# Patient Record
Sex: MALE | Race: BLACK OR AFRICAN AMERICAN | Employment: FULL TIME | URBAN - METROPOLITAN AREA
[De-identification: names, ages, dates, MRNs, and addresses within clinical notes are randomized per-mention and may not be internally consistent; named-entity substitution may affect disease eponyms.]

---

## 2019-09-12 ENCOUNTER — HOSPITAL ENCOUNTER (EMERGENCY)
Facility: HOSPITAL | Age: 29
Discharge: HOME | End: 2019-09-12
Attending: EMERGENCY MEDICINE
Payer: COMMERCIAL

## 2019-09-12 VITALS
DIASTOLIC BLOOD PRESSURE: 76 MMHG | HEIGHT: 68 IN | RESPIRATION RATE: 18 BRPM | TEMPERATURE: 99 F | OXYGEN SATURATION: 98 % | SYSTOLIC BLOOD PRESSURE: 116 MMHG | BODY MASS INDEX: 24.25 KG/M2 | WEIGHT: 160 LBS | HEART RATE: 86 BPM

## 2019-09-12 DIAGNOSIS — J02.9 VIRAL PHARYNGITIS: Primary | ICD-10-CM

## 2019-09-12 LAB — S PYO AG THROAT QL: NEGATIVE

## 2019-09-12 PROCEDURE — 99282 EMERGENCY DEPT VISIT SF MDM: CPT

## 2019-09-12 PROCEDURE — 87430 STREP A AG IA: CPT

## 2019-09-13 NOTE — ED NOTES
/79   Pulse 92   Temp 98.3 °F (36.8 °C) (Oral)   Resp 18   Ht 172.7 cm (5' 8\")   Wt 72.6 kg   SpO2 96%   BMI 24.33 kg/m²     Chief complaint reviewed  Patient here to get a \"throat test\" airway patent lung sounds clear.  Speaking in clear ful lsent

## 2019-09-13 NOTE — ED PROVIDER NOTES
Patient Seen in: [facility] AND [facility] Emergency Department    History   Patient presents with: Allergic Rxn Allergies (immune)    Stated Complaint: allergic reaction    HPI    36yoM with hx of GERD, here with c/o neck pain, sore throat for the past 3 days. reviewed and negative except as noted above.     Physical Exam     ED Triage Vitals [09/12/19 1951]   /79   Pulse 92   Resp 18   Temp 98.3 °F (36.8 °C)   Temp src Oral   SpO2 96 %   O2 Device None (Room air)       Current:/79   Pulse 92   Temp 9 Imaging Results Available and Reviewed by me while in ED:          EMERGENCY DEPARTMENT COURSE AND TREATMENT:  Patient's condition was stable during Emergency Department evaluation.      28yoM with sore throat  - I personally reviewed and interpreted

## 2019-09-13 NOTE — ED INITIAL ASSESSMENT (HPI)
Pt has been taking Ranitidine and believes he's having an allergic reaction. C/o difficulty swallowing. Airway and breathing intact at this time.

## 2020-04-29 ENCOUNTER — APPOINTMENT (OUTPATIENT)
Dept: GENERAL RADIOLOGY | Facility: HOSPITAL | Age: 30
End: 2020-04-29
Attending: EMERGENCY MEDICINE
Payer: COMMERCIAL

## 2020-04-29 ENCOUNTER — HOSPITAL ENCOUNTER (EMERGENCY)
Facility: HOSPITAL | Age: 30
Discharge: HOME | End: 2020-04-29
Attending: EMERGENCY MEDICINE
Payer: COMMERCIAL

## 2020-04-29 VITALS
SYSTOLIC BLOOD PRESSURE: 125 MMHG | DIASTOLIC BLOOD PRESSURE: 76 MMHG | RESPIRATION RATE: 16 BRPM | WEIGHT: 150 LBS | BODY MASS INDEX: 22.73 KG/M2 | HEIGHT: 68 IN | TEMPERATURE: 98 F | HEART RATE: 62 BPM | OXYGEN SATURATION: 97 %

## 2020-04-29 DIAGNOSIS — R06.02 SHORTNESS OF BREATH: ICD-10-CM

## 2020-04-29 DIAGNOSIS — Z20.822 SUSPECTED 2019 NOVEL CORONAVIRUS INFECTION: Primary | ICD-10-CM

## 2020-04-29 PROCEDURE — 93005 ELECTROCARDIOGRAM TRACING: CPT

## 2020-04-29 PROCEDURE — 71045 X-RAY EXAM CHEST 1 VIEW: CPT | Performed by: EMERGENCY MEDICINE

## 2020-04-29 PROCEDURE — 99284 EMERGENCY DEPT VISIT MOD MDM: CPT

## 2020-04-29 PROCEDURE — 93010 ELECTROCARDIOGRAM REPORT: CPT | Performed by: EMERGENCY MEDICINE

## 2020-04-29 NOTE — ED INITIAL ASSESSMENT (HPI)
patient states he is feeling intermittent SOB since Friday. Denies cough. Was tested for covid yesterday and was  negative.

## 2020-04-29 NOTE — ED PROVIDER NOTES
Patient Seen in: [facility] AND [facility] Emergency Department    History   Patient presents with:  Dyspnea JOHN SOB      HPI    Patient presents to the ED complaining of intermittent shortness of breath for the past several days.   No cough but does feel occasio Stethoscope and any equipment used during my examination was cleaned with super sani-cloth germicidal wipes following the exam.     Physical Exam   Constitutional: He is oriented to person, place, and time. He appears well-developed and well-nourished.  No have a problem list on file. to contribute to the complexity of this ED evaluation. ED Course: Patient presents to the ED with possible COVID-19 infection. Rapid testing performed and is negative.   Patient advised on possible false positive test result

## 2020-09-23 ENCOUNTER — HOSPITAL ENCOUNTER (EMERGENCY)
Facility: HOSPITAL | Age: 30
Discharge: HOME | End: 2020-09-23
Attending: EMERGENCY MEDICINE
Payer: COMMERCIAL

## 2020-09-23 VITALS
OXYGEN SATURATION: 98 % | WEIGHT: 155 LBS | TEMPERATURE: 98 F | DIASTOLIC BLOOD PRESSURE: 81 MMHG | RESPIRATION RATE: 18 BRPM | BODY MASS INDEX: 23.49 KG/M2 | SYSTOLIC BLOOD PRESSURE: 123 MMHG | HEART RATE: 91 BPM | HEIGHT: 68 IN

## 2020-09-23 DIAGNOSIS — R19.7 DIARRHEA, UNSPECIFIED TYPE: Primary | ICD-10-CM

## 2020-09-23 LAB
ANION GAP SERPL CALC-SCNC: 4 MMOL/L (ref 0–18)
BASOPHILS # BLD AUTO: 0.05 X10(3) UL (ref 0–0.2)
BASOPHILS NFR BLD AUTO: 1.2 %
BILIRUB UR QL: NEGATIVE
BUN BLD-MCNC: 10 MG/DL (ref 7–18)
BUN/CREAT SERPL: 11.2 (ref 10–20)
CALCIUM BLD-MCNC: 8.5 MG/DL (ref 8.5–10.1)
CHLORIDE SERPL-SCNC: 110 MMOL/L (ref 98–112)
CLARITY UR: CLEAR
CO2 SERPL-SCNC: 27 MMOL/L (ref 21–32)
COLOR UR: YELLOW
CREAT BLD-MCNC: 0.89 MG/DL
DEPRECATED RDW RBC AUTO: 37.6 FL (ref 35.1–46.3)
EOSINOPHIL # BLD AUTO: 0.19 X10(3) UL (ref 0–0.7)
EOSINOPHIL NFR BLD AUTO: 4.6 %
ERYTHROCYTE [DISTWIDTH] IN BLOOD BY AUTOMATED COUNT: 11.7 % (ref 11–15)
GLUCOSE BLD-MCNC: 94 MG/DL (ref 70–99)
GLUCOSE UR-MCNC: NEGATIVE MG/DL
HCT VFR BLD AUTO: 39.9 %
HGB BLD-MCNC: 13.4 G/DL
HGB UR QL STRIP.AUTO: NEGATIVE
IMM GRANULOCYTES # BLD AUTO: 0.01 X10(3) UL (ref 0–1)
IMM GRANULOCYTES NFR BLD: 0.2 %
KETONES UR-MCNC: NEGATIVE MG/DL
LEUKOCYTE ESTERASE UR QL STRIP.AUTO: NEGATIVE
LYMPHOCYTES # BLD AUTO: 1.48 X10(3) UL (ref 1–4)
LYMPHOCYTES NFR BLD AUTO: 35.6 %
MCH RBC QN AUTO: 30.1 PG (ref 26–34)
MCHC RBC AUTO-ENTMCNC: 33.6 G/DL (ref 31–37)
MCV RBC AUTO: 89.7 FL
MONOCYTES # BLD AUTO: 0.51 X10(3) UL (ref 0.1–1)
MONOCYTES NFR BLD AUTO: 12.3 %
NEUTROPHILS # BLD AUTO: 1.92 X10 (3) UL (ref 1.5–7.7)
NEUTROPHILS # BLD AUTO: 1.92 X10(3) UL (ref 1.5–7.7)
NEUTROPHILS NFR BLD AUTO: 46.1 %
NITRITE UR QL STRIP.AUTO: NEGATIVE
OSMOLALITY SERPL CALC.SUM OF ELEC: 291 MOSM/KG (ref 275–295)
PH UR: 5 [PH] (ref 5–8)
PLATELET # BLD AUTO: 250 10(3)UL (ref 150–450)
POTASSIUM SERPL-SCNC: 3.9 MMOL/L (ref 3.5–5.1)
PROT UR-MCNC: NEGATIVE MG/DL
RBC # BLD AUTO: 4.45 X10(6)UL
SODIUM SERPL-SCNC: 141 MMOL/L (ref 136–145)
SP GR UR STRIP: 1.02 (ref 1–1.03)
UROBILINOGEN UR STRIP-ACNC: 4
WBC # BLD AUTO: 4.2 X10(3) UL (ref 4–11)

## 2020-09-23 PROCEDURE — 36415 COLL VENOUS BLD VENIPUNCTURE: CPT

## 2020-09-23 PROCEDURE — 80048 BASIC METABOLIC PNL TOTAL CA: CPT | Performed by: EMERGENCY MEDICINE

## 2020-09-23 PROCEDURE — 81003 URINALYSIS AUTO W/O SCOPE: CPT | Performed by: EMERGENCY MEDICINE

## 2020-09-23 PROCEDURE — 85025 COMPLETE CBC W/AUTO DIFF WBC: CPT | Performed by: EMERGENCY MEDICINE

## 2020-09-23 PROCEDURE — 99283 EMERGENCY DEPT VISIT LOW MDM: CPT

## 2020-09-23 NOTE — ED PROVIDER NOTES
Patient Seen in: [facility] AND [facility] Emergency Department      History   Patient presents with:  Nausea/Vomiting/Diarrhea    Stated Complaint: diarrhea x 4 days, denies pain    HPI    Patient is a 32-year-old male he states that he is lactose intolerant. distal pulses. Pulmonary/Chest: Effort normal and breath sounds normal. No respiratory distress. Abdominal: Soft. Bowel sounds are normal. Exhibits no distension and no mass. There is no tenderness. There is no rebound and no guarding.    Musculoskelet no discharge medications for this patient.

## 2021-02-05 ENCOUNTER — HOSPITAL ENCOUNTER (EMERGENCY)
Facility: HOSPITAL | Age: 31
Discharge: HOME | End: 2021-02-05
Attending: EMERGENCY MEDICINE
Payer: COMMERCIAL

## 2021-02-05 ENCOUNTER — APPOINTMENT (OUTPATIENT)
Dept: GENERAL RADIOLOGY | Facility: HOSPITAL | Age: 31
End: 2021-02-05
Attending: EMERGENCY MEDICINE
Payer: COMMERCIAL

## 2021-02-05 VITALS
WEIGHT: 165 LBS | TEMPERATURE: 98 F | DIASTOLIC BLOOD PRESSURE: 81 MMHG | RESPIRATION RATE: 18 BRPM | SYSTOLIC BLOOD PRESSURE: 129 MMHG | HEIGHT: 68 IN | BODY MASS INDEX: 25.01 KG/M2 | OXYGEN SATURATION: 98 % | HEART RATE: 63 BPM

## 2021-02-05 DIAGNOSIS — R07.89 CHEST PAIN, ATYPICAL: Primary | ICD-10-CM

## 2021-02-05 LAB
ANION GAP SERPL CALC-SCNC: 5 MMOL/L (ref 0–18)
BASOPHILS # BLD AUTO: 0.03 X10(3) UL (ref 0–0.2)
BASOPHILS NFR BLD AUTO: 0.5 %
BUN BLD-MCNC: 12 MG/DL (ref 7–18)
BUN/CREAT SERPL: 11.9 (ref 10–20)
CALCIUM BLD-MCNC: 8.7 MG/DL (ref 8.5–10.1)
CHLORIDE SERPL-SCNC: 109 MMOL/L (ref 98–112)
CO2 SERPL-SCNC: 28 MMOL/L (ref 21–32)
CREAT BLD-MCNC: 1.01 MG/DL
DEPRECATED RDW RBC AUTO: 37.9 FL (ref 35.1–46.3)
EOSINOPHIL # BLD AUTO: 0.12 X10(3) UL (ref 0–0.7)
EOSINOPHIL NFR BLD AUTO: 2.1 %
ERYTHROCYTE [DISTWIDTH] IN BLOOD BY AUTOMATED COUNT: 11.8 % (ref 11–15)
GLUCOSE BLD-MCNC: 81 MG/DL (ref 70–99)
HCT VFR BLD AUTO: 40.1 %
HGB BLD-MCNC: 13.7 G/DL
IMM GRANULOCYTES # BLD AUTO: 0.01 X10(3) UL (ref 0–1)
IMM GRANULOCYTES NFR BLD: 0.2 %
LYMPHOCYTES # BLD AUTO: 2.15 X10(3) UL (ref 1–4)
LYMPHOCYTES NFR BLD AUTO: 38.1 %
MCH RBC QN AUTO: 30.5 PG (ref 26–34)
MCHC RBC AUTO-ENTMCNC: 34.2 G/DL (ref 31–37)
MCV RBC AUTO: 89.3 FL
MONOCYTES # BLD AUTO: 0.56 X10(3) UL (ref 0.1–1)
MONOCYTES NFR BLD AUTO: 9.9 %
NEUTROPHILS # BLD AUTO: 2.77 X10 (3) UL (ref 1.5–7.7)
NEUTROPHILS # BLD AUTO: 2.77 X10(3) UL (ref 1.5–7.7)
NEUTROPHILS NFR BLD AUTO: 49.2 %
OSMOLALITY SERPL CALC.SUM OF ELEC: 293 MOSM/KG (ref 275–295)
PLATELET # BLD AUTO: 316 10(3)UL (ref 150–450)
POTASSIUM SERPL-SCNC: 3.8 MMOL/L (ref 3.5–5.1)
RBC # BLD AUTO: 4.49 X10(6)UL
SODIUM SERPL-SCNC: 142 MMOL/L (ref 136–145)
TROPONIN I SERPL-MCNC: <0.045 NG/ML (ref ?–0.04)
WBC # BLD AUTO: 5.6 X10(3) UL (ref 4–11)

## 2021-02-05 PROCEDURE — 84484 ASSAY OF TROPONIN QUANT: CPT | Performed by: EMERGENCY MEDICINE

## 2021-02-05 PROCEDURE — 99284 EMERGENCY DEPT VISIT MOD MDM: CPT

## 2021-02-05 PROCEDURE — 71045 X-RAY EXAM CHEST 1 VIEW: CPT | Performed by: EMERGENCY MEDICINE

## 2021-02-05 PROCEDURE — 93005 ELECTROCARDIOGRAM TRACING: CPT

## 2021-02-05 PROCEDURE — 36415 COLL VENOUS BLD VENIPUNCTURE: CPT

## 2021-02-05 PROCEDURE — 80048 BASIC METABOLIC PNL TOTAL CA: CPT | Performed by: EMERGENCY MEDICINE

## 2021-02-05 PROCEDURE — 85025 COMPLETE CBC W/AUTO DIFF WBC: CPT | Performed by: EMERGENCY MEDICINE

## 2021-02-05 PROCEDURE — 93010 ELECTROCARDIOGRAM REPORT: CPT | Performed by: EMERGENCY MEDICINE

## 2021-02-05 RX ORDER — ALBUTEROL SULFATE 90 UG/1
AEROSOL, METERED RESPIRATORY (INHALATION) EVERY 6 HOURS PRN
COMMUNITY

## 2021-02-05 NOTE — ED NOTES
Patient also admitted dealing with acid reflux. Patient was treating himself with ginger ale and milk.

## 2021-02-05 NOTE — ED PROVIDER NOTES
Patient Seen in: [facility] AND [facility] Emergency Department      History   Patient presents with:  Chest Pain Angina    Stated Complaint: chest pressure    HPI/Subjective:   HPI    Patient is a 60-year-old male with history of asthma who presents with interm distension  Extremities: FROM of all extremities, no cyanosis/clubbing/edema  Neuro: CN intact, normal speech, normal gait, 5/5 motor strength in all extremities, no focal deficits  SKIN: warm, dry, no rashes        ED Course     Labs Reviewed   BASIC META Hypercholesterolemia: No   Atherosclerosis/PVD: No   DM: No   BMI>30kg/m2: No   Smoking: No   Family History: No         Other Risk Factor Score: 0           Lab Results   Component Value Date    TROP <0.045 02/05/2021           HEART Score: 0        Ris

## 2021-02-05 NOTE — ED INITIAL ASSESSMENT (HPI)
C/o chest pressure that started yesterday, but worse this morning. Pt does endorse chest congestion all last week, but has since resolved. Hx asthma, used inhaler just pta.

## 2021-02-05 NOTE — ED NOTES
Patient admits smoking Bo Sweets a week ago after a long time. Patient states developing chest pain after that. Patient states that the chest pain was only after he was using weight.

## 2021-12-03 ENCOUNTER — HOSPITAL ENCOUNTER (EMERGENCY)
Facility: HOSPITAL | Age: 31
Discharge: HOME | End: 2021-12-03
Attending: EMERGENCY MEDICINE
Payer: COMMERCIAL

## 2021-12-03 VITALS
TEMPERATURE: 99 F | OXYGEN SATURATION: 98 % | DIASTOLIC BLOOD PRESSURE: 71 MMHG | RESPIRATION RATE: 16 BRPM | HEIGHT: 68 IN | SYSTOLIC BLOOD PRESSURE: 116 MMHG | WEIGHT: 150 LBS | BODY MASS INDEX: 22.73 KG/M2 | HEART RATE: 95 BPM

## 2021-12-03 DIAGNOSIS — U07.1 COVID-19 VIRUS INFECTION: Primary | ICD-10-CM

## 2021-12-03 PROCEDURE — 99283 EMERGENCY DEPT VISIT LOW MDM: CPT

## 2021-12-04 NOTE — ED INITIAL ASSESSMENT (HPI)
Patient coming in with complaints of headaches, chills and body aches that started last week Wednesday. He reports it went away Saturday-Monday.  However is back, rating the headaches a 5/10

## 2021-12-04 NOTE — ED PROVIDER NOTES
Patient Seen in: [facility] Emergency Department    History   Patient presents with:  Headache      HPI    71-year-old male presents the ER with complaint of headache, body aches, chills for the past 9 days. Patient the past Celia Espinoza of asthma.   Gela sani-cloth germicidal wipes following the exam.     Physical Exam  Vitals and nursing note reviewed. Constitutional:       Appearance: He is well-developed. HENT:      Head: Normocephalic and atraumatic.       Right Ear: External ear normal.      Left E COVID-19, viral syndrome, URI. Medical Record Review: I personally reviewed available prior medical records for any recent pertinent discharge summaries, testing, and procedures and reviewed those reports. Complicating Factors:  The patient already ha

## 2021-12-13 ENCOUNTER — HOSPITAL ENCOUNTER (EMERGENCY)
Facility: HOSPITAL | Age: 31
Discharge: HOME | End: 2021-12-13
Attending: EMERGENCY MEDICINE
Payer: COMMERCIAL

## 2021-12-13 VITALS
WEIGHT: 150 LBS | HEIGHT: 68 IN | BODY MASS INDEX: 22.73 KG/M2 | SYSTOLIC BLOOD PRESSURE: 116 MMHG | DIASTOLIC BLOOD PRESSURE: 69 MMHG | RESPIRATION RATE: 16 BRPM | HEART RATE: 90 BPM | OXYGEN SATURATION: 96 % | TEMPERATURE: 98 F

## 2021-12-13 DIAGNOSIS — Z20.822 ENCOUNTER FOR LABORATORY TESTING FOR COVID-19 VIRUS: Primary | ICD-10-CM

## 2021-12-13 PROCEDURE — 99283 EMERGENCY DEPT VISIT LOW MDM: CPT

## 2021-12-13 NOTE — ED INITIAL ASSESSMENT (HPI)
Tested positive for covid on 1`2/3/21. Came to er to have another test done in order to return to work. asymptomatic at this time.

## 2021-12-14 NOTE — ED PROVIDER NOTES
Patient Seen in: [facility] Emergency Department    History   Patient presents with:  Covid-19 Test    Stated Complaint: testing    HPI    Patient here with request for covid test.  Sick 2 weeks ago with covid needs neg test to return to work.   No PCR - Normal       MDM     Radiology:        Disposition and Plan     Clinical Impression:  Encounter for laboratory testing for COVID-19 virus  (primary encounter diagnosis)    Disposition:  Discharge    Follow-up:  Andrew Jarrett  [id number] [facility]

## 2021-12-16 ENCOUNTER — APPOINTMENT (OUTPATIENT)
Dept: GENERAL RADIOLOGY | Facility: HOSPITAL | Age: 31
End: 2021-12-16
Attending: EMERGENCY MEDICINE
Payer: COMMERCIAL

## 2021-12-16 ENCOUNTER — HOSPITAL ENCOUNTER (EMERGENCY)
Facility: HOSPITAL | Age: 31
Discharge: HOME | End: 2021-12-16
Attending: EMERGENCY MEDICINE
Payer: COMMERCIAL

## 2021-12-16 VITALS
HEIGHT: 68 IN | WEIGHT: 150 LBS | OXYGEN SATURATION: 97 % | TEMPERATURE: 98 F | HEART RATE: 70 BPM | BODY MASS INDEX: 22.73 KG/M2 | SYSTOLIC BLOOD PRESSURE: 128 MMHG | RESPIRATION RATE: 15 BRPM | DIASTOLIC BLOOD PRESSURE: 81 MMHG

## 2021-12-16 DIAGNOSIS — R00.2 PALPITATIONS: Primary | ICD-10-CM

## 2021-12-16 PROCEDURE — 93010 ELECTROCARDIOGRAM REPORT: CPT | Performed by: EMERGENCY MEDICINE

## 2021-12-16 PROCEDURE — 93005 ELECTROCARDIOGRAM TRACING: CPT

## 2021-12-16 PROCEDURE — 84484 ASSAY OF TROPONIN QUANT: CPT | Performed by: EMERGENCY MEDICINE

## 2021-12-16 PROCEDURE — 85025 COMPLETE CBC W/AUTO DIFF WBC: CPT | Performed by: EMERGENCY MEDICINE

## 2021-12-16 PROCEDURE — 36415 COLL VENOUS BLD VENIPUNCTURE: CPT

## 2021-12-16 PROCEDURE — 84484 ASSAY OF TROPONIN QUANT: CPT

## 2021-12-16 PROCEDURE — 85025 COMPLETE CBC W/AUTO DIFF WBC: CPT

## 2021-12-16 PROCEDURE — 71045 X-RAY EXAM CHEST 1 VIEW: CPT | Performed by: EMERGENCY MEDICINE

## 2021-12-16 PROCEDURE — 80048 BASIC METABOLIC PNL TOTAL CA: CPT | Performed by: EMERGENCY MEDICINE

## 2021-12-16 PROCEDURE — 80048 BASIC METABOLIC PNL TOTAL CA: CPT

## 2021-12-16 PROCEDURE — 99284 EMERGENCY DEPT VISIT MOD MDM: CPT

## 2021-12-16 NOTE — ED INITIAL ASSESSMENT (HPI)
Patient states he was positive with covid 12/3. Patient states he has been drinking Pedialyte daily. Patient also drank an energy drink. Patient states he felt like his heart was racing today. Denies chest pain.

## 2021-12-16 NOTE — ED PROVIDER NOTES
Patient Seen in: [facility] AND [facility] Emergency Department      History   Patient presents with:  Arrythmia/Palpitations    Stated Complaint: Tachycardia    Subjective:   HPI    71-year-old male with history of asthma, esophageal reflux, and recent COVID-19 clear to auscultation  Cardiovascular: regular rate and rhythm  Gastrointestinal:  abdomen is soft and non tender, no masses, bowel sounds normal  Neurological: Speech normal.  Moving extremities equally x4. Skin: warm and dry, no rashes.   Musculoskeletal encounter diagnosis)     Disposition:  Discharge  12/16/2021  6:40 pm    Follow-up:  Elicia Melo[address]   [phone]                Medications Prescribed:  Current Discharge Medication List

## 2022-01-31 ENCOUNTER — HOSPITAL ENCOUNTER (EMERGENCY)
Facility: HOSPITAL | Age: 32
Discharge: HOME | End: 2022-01-31
Attending: EMERGENCY MEDICINE
Payer: COMMERCIAL

## 2022-01-31 VITALS
SYSTOLIC BLOOD PRESSURE: 123 MMHG | WEIGHT: 150 LBS | TEMPERATURE: 98 F | RESPIRATION RATE: 18 BRPM | HEART RATE: 61 BPM | OXYGEN SATURATION: 97 % | DIASTOLIC BLOOD PRESSURE: 79 MMHG | BODY MASS INDEX: 23 KG/M2

## 2022-01-31 DIAGNOSIS — T39.315A: Primary | ICD-10-CM

## 2022-01-31 LAB
ANION GAP SERPL CALC-SCNC: 3 MMOL/L (ref 0–18)
BASOPHILS # BLD AUTO: 0.04 X10(3) UL (ref 0–0.2)
BASOPHILS NFR BLD AUTO: 0.7 %
BUN BLD-MCNC: 15 MG/DL (ref 7–18)
BUN/CREAT SERPL: 14.9 (ref 10–20)
CALCIUM BLD-MCNC: 9 MG/DL (ref 8.5–10.1)
CHLORIDE SERPL-SCNC: 107 MMOL/L (ref 98–112)
CLARITY UR: CLEAR
CO2 SERPL-SCNC: 31 MMOL/L (ref 21–32)
COLOR UR: YELLOW
DEPRECATED RDW RBC AUTO: 41.8 FL (ref 35.1–46.3)
EOSINOPHIL # BLD AUTO: 0.07 X10(3) UL (ref 0–0.7)
EOSINOPHIL NFR BLD AUTO: 1.2 %
ERYTHROCYTE [DISTWIDTH] IN BLOOD BY AUTOMATED COUNT: 12.2 % (ref 11–15)
GLUCOSE BLD-MCNC: 85 MG/DL (ref 70–99)
GLUCOSE UR-MCNC: NEGATIVE MG/DL
HCT VFR BLD AUTO: 42.2 %
HGB BLD-MCNC: 13.8 G/DL
HGB UR QL STRIP.AUTO: NEGATIVE
IMM GRANULOCYTES # BLD AUTO: 0.01 X10(3) UL (ref 0–1)
IMM GRANULOCYTES NFR BLD: 0.2 %
LEUKOCYTE ESTERASE UR QL STRIP.AUTO: NEGATIVE
LYMPHOCYTES # BLD AUTO: 1.81 X10(3) UL (ref 1–4)
LYMPHOCYTES NFR BLD AUTO: 30.4 %
MCH RBC QN AUTO: 30.4 PG (ref 26–34)
MCHC RBC AUTO-ENTMCNC: 32.7 G/DL (ref 31–37)
MCV RBC AUTO: 93 FL
MONOCYTES NFR BLD AUTO: 11.9 %
NEUTROPHILS # BLD AUTO: 3.31 X10 (3) UL (ref 1.5–7.7)
NEUTROPHILS # BLD AUTO: 3.31 X10(3) UL (ref 1.5–7.7)
NEUTROPHILS NFR BLD AUTO: 55.6 %
NITRITE UR QL STRIP.AUTO: NEGATIVE
OSMOLALITY SERPL CALC.SUM OF ELEC: 292 MOSM/KG (ref 275–295)
PLATELET # BLD AUTO: 295 10(3)UL (ref 150–450)
POTASSIUM SERPL-SCNC: 4.2 MMOL/L (ref 3.5–5.1)
PROT UR-MCNC: NEGATIVE MG/DL
RBC # BLD AUTO: 4.54 X10(6)UL
SODIUM SERPL-SCNC: 141 MMOL/L (ref 136–145)
SP GR UR STRIP: 1.02 (ref 1–1.03)
UROBILINOGEN UR STRIP-ACNC: 4
WBC # BLD AUTO: 6 X10(3) UL (ref 4–11)

## 2022-01-31 PROCEDURE — 80048 BASIC METABOLIC PNL TOTAL CA: CPT | Performed by: EMERGENCY MEDICINE

## 2022-01-31 PROCEDURE — 99283 EMERGENCY DEPT VISIT LOW MDM: CPT

## 2022-01-31 PROCEDURE — 85025 COMPLETE CBC W/AUTO DIFF WBC: CPT | Performed by: EMERGENCY MEDICINE

## 2022-01-31 PROCEDURE — 36415 COLL VENOUS BLD VENIPUNCTURE: CPT

## 2022-01-31 PROCEDURE — 81003 URINALYSIS AUTO W/O SCOPE: CPT | Performed by: EMERGENCY MEDICINE

## 2022-01-31 NOTE — ED INITIAL ASSESSMENT (HPI)
Patient with c/o \"withdrawals\" from taking aleve on tuesday. States he is having side effects from the aleve he had taken c/o dehydrated, dry throat, and nausea.

## 2022-04-07 ENCOUNTER — HOSPITAL ENCOUNTER (EMERGENCY)
Facility: HOSPITAL | Age: 32
Discharge: HOME | End: 2022-04-07
Attending: EMERGENCY MEDICINE
Payer: COMMERCIAL

## 2022-04-07 VITALS
BODY MASS INDEX: 22.73 KG/M2 | TEMPERATURE: 98 F | HEIGHT: 68 IN | SYSTOLIC BLOOD PRESSURE: 136 MMHG | DIASTOLIC BLOOD PRESSURE: 71 MMHG | WEIGHT: 150 LBS | RESPIRATION RATE: 18 BRPM | OXYGEN SATURATION: 96 % | HEART RATE: 76 BPM

## 2022-04-07 DIAGNOSIS — R14.1 GAS PAIN: Primary | ICD-10-CM

## 2022-04-07 PROCEDURE — 99283 EMERGENCY DEPT VISIT LOW MDM: CPT

## 2022-04-07 RX ORDER — DICYCLOMINE HCL 20 MG
20 TABLET ORAL ONCE
Status: COMPLETED | OUTPATIENT
Start: 2022-04-07 | End: 2022-04-07

## 2022-04-07 RX ORDER — DICYCLOMINE HCL 20 MG
20 TABLET ORAL 4 TIMES DAILY PRN
Qty: 30 TABLET | Refills: 0 | Status: SHIPPED | OUTPATIENT
Start: 2022-04-07 | End: 2022-05-07

## 2022-04-13 ENCOUNTER — TELEPHONE (OUTPATIENT)
Dept: GASTROENTEROLOGY | Facility: CLINIC | Age: 32
End: 2022-04-13

## 2022-04-13 ENCOUNTER — HOSPITAL ENCOUNTER (EMERGENCY)
Facility: HOSPITAL | Age: 32
Discharge: HOME | End: 2022-04-13
Attending: EMERGENCY MEDICINE
Payer: COMMERCIAL

## 2022-04-13 ENCOUNTER — OFFICE VISIT (OUTPATIENT)
Dept: GASTROENTEROLOGY | Facility: CLINIC | Age: 32
End: 2022-04-13
Payer: COMMERCIAL

## 2022-04-13 VITALS
RESPIRATION RATE: 18 BRPM | DIASTOLIC BLOOD PRESSURE: 80 MMHG | OXYGEN SATURATION: 97 % | SYSTOLIC BLOOD PRESSURE: 132 MMHG | BODY MASS INDEX: 22.73 KG/M2 | HEART RATE: 62 BPM | WEIGHT: 150 LBS | HEIGHT: 68 IN | TEMPERATURE: 98 F

## 2022-04-13 VITALS
BODY MASS INDEX: 23.74 KG/M2 | HEART RATE: 80 BPM | WEIGHT: 156.63 LBS | SYSTOLIC BLOOD PRESSURE: 111 MMHG | DIASTOLIC BLOOD PRESSURE: 63 MMHG | HEIGHT: 68 IN

## 2022-04-13 DIAGNOSIS — R13.19 ESOPHAGEAL DYSPHAGIA: ICD-10-CM

## 2022-04-13 DIAGNOSIS — K59.00 CONSTIPATION, UNSPECIFIED CONSTIPATION TYPE: ICD-10-CM

## 2022-04-13 DIAGNOSIS — R10.10 PAIN OF UPPER ABDOMEN: Primary | ICD-10-CM

## 2022-04-13 DIAGNOSIS — R09.89 SENSATION OF FOREIGN BODY IN LARYNX: Primary | ICD-10-CM

## 2022-04-13 PROCEDURE — 99244 OFF/OP CNSLTJ NEW/EST MOD 40: CPT | Performed by: INTERNAL MEDICINE

## 2022-04-13 PROCEDURE — 3008F BODY MASS INDEX DOCD: CPT | Performed by: INTERNAL MEDICINE

## 2022-04-13 PROCEDURE — 3074F SYST BP LT 130 MM HG: CPT | Performed by: INTERNAL MEDICINE

## 2022-04-13 PROCEDURE — 3078F DIAST BP <80 MM HG: CPT | Performed by: INTERNAL MEDICINE

## 2022-04-13 PROCEDURE — 99282 EMERGENCY DEPT VISIT SF MDM: CPT

## 2022-04-13 NOTE — TELEPHONE ENCOUNTER
Scheduled for:  Leonard Ruiz [id number] with possible dilation   Provider Name:  Jason Chiang  Date:  5/6/22  Location:  [facility]   Sedation:  Mac  Time:  3:30 Pm (pt is aware to arrive at 2:30 Pm)   Prep: Egd ,Npo 3 hrs before prior to procedure arrival  Prep instructions were given to pt in the office, pt verbalized understanding. Meds/Allergies Reconciled?:  Physician reviewed     Diagnosis with codes:  Esophageal dysphagia R13.19 , Upper abdomen pain R10.10  Was patient informed to call insurance with codes (Y/N):  Yes, I confirmed [employer] insurance with the patient. The patient also verbally understands to call HIS insurance to check for pre-cert, codes were given on prep instructions. Referral sent?:  yES   [facility] or [address] notified?:  I sent an electronic request to Endo Scheduling and received a confirmation today. Medication Orders: This patient verbally confirmed that HE is not taking:   Iron, blood thinners, BP meds, and is not diabetic   Not latex allergy, Not PCN allergy and does not have a pacemaker Pt is aware to NOT take iron pills, herbal meds and diet supplements for 7 days before exam. Also to NOT take any form of alcohol, recreational drugs and any forms of ED meds 24 hours before exam.    Misc Orders:  Patient was informed that they will need a COVID 19 test prior to their procedure. Patient verbally understood & will await a phone call from [facility] to schedule.       Further instructions given by staff:

## 2022-04-13 NOTE — PATIENT INSTRUCTIONS
1. Pain of upper abdomen  Likely from constipation, improved with BM    2. Constipation, unspecified constipation type  More when unable to go or change in diet    3.  Esophageal dysphagia  Concern about EoE vs reflux    Recommend:  Keep bowels regular  If needed can take miralax daily as needed  Try to eat more fiber, can take metamucil as needed for added fiber or fiber gummy  Famotidine 20 mg daily at night  -Schedule EGD w/ possible biopsy/dilation w/MAC sedation for dysphagia    ** If MAC @ [facility]:    - NO alcohol, recreational drugs nor erectile dysfunction mediations 24 hours before procedure(s)   - NO herbal supplements or weight loss medications x 7 days prior to the procedure(s)    ** If MAC @ [facility] or IV twilight - continue all medications as prescribed

## 2022-04-15 ENCOUNTER — HOSPITAL ENCOUNTER (EMERGENCY)
Facility: HOSPITAL | Age: 32
Discharge: HOME | End: 2022-04-15
Attending: EMERGENCY MEDICINE
Payer: COMMERCIAL

## 2022-04-15 VITALS
OXYGEN SATURATION: 99 % | WEIGHT: 150 LBS | SYSTOLIC BLOOD PRESSURE: 118 MMHG | RESPIRATION RATE: 18 BRPM | BODY MASS INDEX: 23 KG/M2 | HEART RATE: 82 BPM | DIASTOLIC BLOOD PRESSURE: 80 MMHG | TEMPERATURE: 98 F

## 2022-04-15 DIAGNOSIS — J39.2 PHARYNGEAL IRRITATION: Primary | ICD-10-CM

## 2022-04-15 PROCEDURE — 93010 ELECTROCARDIOGRAM REPORT: CPT | Performed by: EMERGENCY MEDICINE

## 2022-04-15 PROCEDURE — 99283 EMERGENCY DEPT VISIT LOW MDM: CPT

## 2022-04-15 PROCEDURE — 93005 ELECTROCARDIOGRAM TRACING: CPT

## 2022-04-15 RX ORDER — PANTOPRAZOLE SODIUM 40 MG/1
40 TABLET, DELAYED RELEASE ORAL DAILY
Qty: 30 TABLET | Refills: 0 | Status: SHIPPED | OUTPATIENT
Start: 2022-04-15 | End: 2022-05-15

## 2022-04-15 NOTE — ED INITIAL ASSESSMENT (HPI)
Patient complains of hemoptosis, mucousy cough, and chest pain since this am. Was seen here two days ago for food bolus

## 2022-04-18 ENCOUNTER — APPOINTMENT (OUTPATIENT)
Dept: GENERAL RADIOLOGY | Facility: HOSPITAL | Age: 32
End: 2022-04-18
Attending: EMERGENCY MEDICINE
Payer: COMMERCIAL

## 2022-04-18 ENCOUNTER — HOSPITAL ENCOUNTER (EMERGENCY)
Facility: HOSPITAL | Age: 32
Discharge: HOME | End: 2022-04-18
Attending: EMERGENCY MEDICINE
Payer: COMMERCIAL

## 2022-04-18 VITALS
HEART RATE: 78 BPM | RESPIRATION RATE: 18 BRPM | SYSTOLIC BLOOD PRESSURE: 130 MMHG | TEMPERATURE: 98 F | DIASTOLIC BLOOD PRESSURE: 82 MMHG | OXYGEN SATURATION: 97 %

## 2022-04-18 DIAGNOSIS — R07.89 CHEST PAIN, ATYPICAL: Primary | ICD-10-CM

## 2022-04-18 LAB
ANION GAP SERPL CALC-SCNC: 6 MMOL/L (ref 0–18)
BASOPHILS # BLD AUTO: 0.04 X10(3) UL (ref 0–0.2)
BASOPHILS NFR BLD AUTO: 0.6 %
BUN BLD-MCNC: 18 MG/DL (ref 7–18)
BUN/CREAT SERPL: 16.4 (ref 10–20)
CALCIUM BLD-MCNC: 8.4 MG/DL (ref 8.5–10.1)
CHLORIDE SERPL-SCNC: 108 MMOL/L (ref 98–112)
CO2 SERPL-SCNC: 26 MMOL/L (ref 21–32)
CREAT BLD-MCNC: 1.1 MG/DL
DEPRECATED RDW RBC AUTO: 39.2 FL (ref 35.1–46.3)
EOSINOPHIL # BLD AUTO: 0.11 X10(3) UL (ref 0–0.7)
EOSINOPHIL NFR BLD AUTO: 1.7 %
ERYTHROCYTE [DISTWIDTH] IN BLOOD BY AUTOMATED COUNT: 11.6 % (ref 11–15)
GLUCOSE BLD-MCNC: 150 MG/DL (ref 70–99)
HCT VFR BLD AUTO: 40.5 %
HGB BLD-MCNC: 13.6 G/DL
IMM GRANULOCYTES # BLD AUTO: 0.01 X10(3) UL (ref 0–1)
IMM GRANULOCYTES NFR BLD: 0.2 %
LYMPHOCYTES # BLD AUTO: 1.75 X10(3) UL (ref 1–4)
LYMPHOCYTES NFR BLD AUTO: 27.7 %
MCH RBC QN AUTO: 30.7 PG (ref 26–34)
MCHC RBC AUTO-ENTMCNC: 33.6 G/DL (ref 31–37)
MCV RBC AUTO: 91.4 FL
MONOCYTES # BLD AUTO: 0.51 X10(3) UL (ref 0.1–1)
MONOCYTES NFR BLD AUTO: 8.1 %
NEUTROPHILS # BLD AUTO: 3.9 X10 (3) UL (ref 1.5–7.7)
NEUTROPHILS # BLD AUTO: 3.9 X10(3) UL (ref 1.5–7.7)
NEUTROPHILS NFR BLD AUTO: 61.7 %
OSMOLALITY SERPL CALC.SUM OF ELEC: 295 MOSM/KG (ref 275–295)
PLATELET # BLD AUTO: 268 10(3)UL (ref 150–450)
POTASSIUM SERPL-SCNC: 3.8 MMOL/L (ref 3.5–5.1)
RBC # BLD AUTO: 4.43 X10(6)UL
SODIUM SERPL-SCNC: 140 MMOL/L (ref 136–145)
TROPONIN I HIGH SENSITIVITY: 6 NG/L
WBC # BLD AUTO: 6.3 X10(3) UL (ref 4–11)

## 2022-04-18 PROCEDURE — 85025 COMPLETE CBC W/AUTO DIFF WBC: CPT | Performed by: EMERGENCY MEDICINE

## 2022-04-18 PROCEDURE — 84484 ASSAY OF TROPONIN QUANT: CPT | Performed by: EMERGENCY MEDICINE

## 2022-04-18 PROCEDURE — 36415 COLL VENOUS BLD VENIPUNCTURE: CPT

## 2022-04-18 PROCEDURE — 99284 EMERGENCY DEPT VISIT MOD MDM: CPT

## 2022-04-18 PROCEDURE — 80048 BASIC METABOLIC PNL TOTAL CA: CPT | Performed by: EMERGENCY MEDICINE

## 2022-04-18 PROCEDURE — 93010 ELECTROCARDIOGRAM REPORT: CPT | Performed by: EMERGENCY MEDICINE

## 2022-04-18 PROCEDURE — 71045 X-RAY EXAM CHEST 1 VIEW: CPT | Performed by: EMERGENCY MEDICINE

## 2022-04-18 PROCEDURE — 93005 ELECTROCARDIOGRAM TRACING: CPT

## 2022-04-18 NOTE — ED INITIAL ASSESSMENT (HPI)
Pt here for continuation of tightness to L pectoral area, worse w/ lifting at work, which he was seen for on Friday and told to return if problem persisted or got worse.

## 2022-05-04 ENCOUNTER — LAB ENCOUNTER (OUTPATIENT)
Dept: LAB | Facility: HOSPITAL | Age: 32
End: 2022-05-04
Attending: INTERNAL MEDICINE
Payer: COMMERCIAL

## 2022-05-04 DIAGNOSIS — Z01.818 PRE-OP TESTING: ICD-10-CM

## 2022-05-04 LAB — SARS-COV-2 RNA RESP QL NAA+PROBE: NOT DETECTED

## 2022-05-06 ENCOUNTER — TELEPHONE (OUTPATIENT)
Dept: GASTROENTEROLOGY | Facility: CLINIC | Age: 32
End: 2022-05-06

## 2022-05-06 NOTE — TELEPHONE ENCOUNTER
Pt left message with answering service that he is cancelling 5/6/22 procedure due to transportation issues. Please call to reschedule.

## 2022-05-20 ENCOUNTER — HOSPITAL ENCOUNTER (EMERGENCY)
Facility: HOSPITAL | Age: 32
Discharge: HOME | End: 2022-05-20
Attending: EMERGENCY MEDICINE
Payer: COMMERCIAL

## 2022-05-20 VITALS
BODY MASS INDEX: 22.73 KG/M2 | WEIGHT: 150 LBS | RESPIRATION RATE: 18 BRPM | SYSTOLIC BLOOD PRESSURE: 123 MMHG | TEMPERATURE: 99 F | DIASTOLIC BLOOD PRESSURE: 74 MMHG | HEART RATE: 80 BPM | OXYGEN SATURATION: 96 % | HEIGHT: 68 IN

## 2022-05-20 DIAGNOSIS — R07.9 CHEST PAIN OF UNCERTAIN ETIOLOGY: Primary | ICD-10-CM

## 2022-05-20 LAB
ANION GAP SERPL CALC-SCNC: 5 MMOL/L (ref 0–18)
BASOPHILS # BLD AUTO: 0.03 X10(3) UL (ref 0–0.2)
BASOPHILS NFR BLD AUTO: 0.6 %
BUN BLD-MCNC: 12 MG/DL (ref 7–18)
BUN/CREAT SERPL: 11.8 (ref 10–20)
CALCIUM BLD-MCNC: 8.7 MG/DL (ref 8.5–10.1)
CHLORIDE SERPL-SCNC: 110 MMOL/L (ref 98–112)
CO2 SERPL-SCNC: 26 MMOL/L (ref 21–32)
CREAT BLD-MCNC: 1.02 MG/DL
DEPRECATED RDW RBC AUTO: 38.2 FL (ref 35.1–46.3)
EOSINOPHIL # BLD AUTO: 0.1 X10(3) UL (ref 0–0.7)
EOSINOPHIL NFR BLD AUTO: 2 %
ERYTHROCYTE [DISTWIDTH] IN BLOOD BY AUTOMATED COUNT: 11.3 % (ref 11–15)
GLUCOSE BLD-MCNC: 120 MG/DL (ref 70–99)
HCT VFR BLD AUTO: 43.9 %
HGB BLD-MCNC: 14.4 G/DL
IMM GRANULOCYTES # BLD AUTO: 0.01 X10(3) UL (ref 0–1)
IMM GRANULOCYTES NFR BLD: 0.2 %
LYMPHOCYTES # BLD AUTO: 1.71 X10(3) UL (ref 1–4)
LYMPHOCYTES NFR BLD AUTO: 34.5 %
MCH RBC QN AUTO: 30.5 PG (ref 26–34)
MCHC RBC AUTO-ENTMCNC: 32.8 G/DL (ref 31–37)
MCV RBC AUTO: 93 FL
MONOCYTES # BLD AUTO: 0.65 X10(3) UL (ref 0.1–1)
MONOCYTES NFR BLD AUTO: 13.1 %
NEUTROPHILS # BLD AUTO: 2.45 X10 (3) UL (ref 1.5–7.7)
NEUTROPHILS # BLD AUTO: 2.45 X10(3) UL (ref 1.5–7.7)
NEUTROPHILS NFR BLD AUTO: 49.6 %
OSMOLALITY SERPL CALC.SUM OF ELEC: 293 MOSM/KG (ref 275–295)
PLATELET # BLD AUTO: 280 10(3)UL (ref 150–450)
POTASSIUM SERPL-SCNC: 4 MMOL/L (ref 3.5–5.1)
RBC # BLD AUTO: 4.72 X10(6)UL
SODIUM SERPL-SCNC: 141 MMOL/L (ref 136–145)
TROPONIN I HIGH SENSITIVITY: 4 NG/L
WBC # BLD AUTO: 5 X10(3) UL (ref 4–11)

## 2022-05-20 PROCEDURE — 80048 BASIC METABOLIC PNL TOTAL CA: CPT | Performed by: EMERGENCY MEDICINE

## 2022-05-20 PROCEDURE — 96374 THER/PROPH/DIAG INJ IV PUSH: CPT

## 2022-05-20 PROCEDURE — 99284 EMERGENCY DEPT VISIT MOD MDM: CPT

## 2022-05-20 PROCEDURE — 84484 ASSAY OF TROPONIN QUANT: CPT | Performed by: EMERGENCY MEDICINE

## 2022-05-20 PROCEDURE — 93005 ELECTROCARDIOGRAM TRACING: CPT

## 2022-05-20 PROCEDURE — 85025 COMPLETE CBC W/AUTO DIFF WBC: CPT | Performed by: EMERGENCY MEDICINE

## 2022-05-20 PROCEDURE — C9113 INJ PANTOPRAZOLE SODIUM, VIA: HCPCS | Performed by: EMERGENCY MEDICINE

## 2022-05-20 PROCEDURE — 93010 ELECTROCARDIOGRAM REPORT: CPT | Performed by: EMERGENCY MEDICINE

## 2022-05-20 NOTE — ED INITIAL ASSESSMENT (HPI)
Chest pain that started last Friday. Denies shortness of breath. Pt recently here for similar symptoms.

## 2022-05-22 ENCOUNTER — PATIENT MESSAGE (OUTPATIENT)
Dept: GASTROENTEROLOGY | Facility: CLINIC | Age: 32
End: 2022-05-22

## 2022-05-23 NOTE — TELEPHONE ENCOUNTER
From: Justin FULLER May  To: Jennifer Sheldon MD  Sent: 5/22/2022 4:25 AM CDT  Subject: Side Effect Issue with Prescribed Medication    Hi this is Gene May i was reaching out too Dr. Martine Joseph too let her know I won't be taking the Omeprazole any more. Too many side effects regarding heart flutters and shortness of breath had happened. As well as a weak / faint sensation in my right arm and it didn't do too much too the heartburn as it should've. But previously before that friday morning i went too the ER due too pretty bad heartburn, i notified them Dr. Martine Joseph had sent me a message earlier that day about taking the Omeprazole and they injected me with another form of it called \"Protonix\". And that one gave me lil too not many side effects only a red marks & 2 hives appearing on my arms but no heart flutters or shortness of breathe and it also relieved alot of my heartburn. So going forth I'll just continue taking Tums until my procedure and if my heartburns flares up bad again I'll go back and take the Portonix.

## 2022-05-23 NOTE — TELEPHONE ENCOUNTER
Dr. Red Duff--    fyi    Added protonix and omeprazole to allergy list given reactions as illustrated below.      Thank you

## 2022-06-13 ENCOUNTER — TELEPHONE (OUTPATIENT)
Dept: GASTROENTEROLOGY | Facility: CLINIC | Age: 32
End: 2022-06-13

## 2022-06-13 DIAGNOSIS — R13.10 DYSPHAGIA, UNSPECIFIED TYPE: Primary | ICD-10-CM

## 2022-06-13 DIAGNOSIS — R10.10 UPPER ABDOMINAL PAIN: ICD-10-CM

## 2022-06-13 NOTE — TELEPHONE ENCOUNTER
Patient left a message with the answering service on , please see below         Message # [phone]         2022 12:39p   [BRITTANY]  Primary MD:  Danika Huang [address] [place] [address]  From:  [employer]  :  10/24/1990  Phone#:  [phone]  ----------------------------------------------------------------------  OFFICE LOCATION:  PT IS CANCELING PROCEDURE FOR 2022

## 2022-06-24 ENCOUNTER — HOSPITAL ENCOUNTER (EMERGENCY)
Facility: HOSPITAL | Age: 32
Discharge: HOME | End: 2022-06-24
Attending: EMERGENCY MEDICINE
Payer: COMMERCIAL

## 2022-06-24 VITALS
HEART RATE: 69 BPM | RESPIRATION RATE: 16 BRPM | SYSTOLIC BLOOD PRESSURE: 120 MMHG | HEIGHT: 68 IN | BODY MASS INDEX: 22.73 KG/M2 | WEIGHT: 150 LBS | OXYGEN SATURATION: 98 % | TEMPERATURE: 98 F | DIASTOLIC BLOOD PRESSURE: 73 MMHG

## 2022-06-24 DIAGNOSIS — M77.12 LATERAL EPICONDYLITIS OF LEFT ELBOW: Primary | ICD-10-CM

## 2022-06-24 DIAGNOSIS — U09.9 COVID-19 LONG HAULER: ICD-10-CM

## 2022-06-24 PROCEDURE — 99282 EMERGENCY DEPT VISIT SF MDM: CPT

## 2022-06-24 NOTE — ED INITIAL ASSESSMENT (HPI)
Pt presents to ED for left elbow pain and states \"it feels lighter than the right arm\". Pt also c/o bilateral leg weakness since he had COVID in December.

## 2022-07-12 ENCOUNTER — OFFICE VISIT (OUTPATIENT)
Dept: NEUROLOGY | Facility: CLINIC | Age: 32
End: 2022-07-12
Payer: COMMERCIAL

## 2022-07-12 VITALS
BODY MASS INDEX: 22.73 KG/M2 | DIASTOLIC BLOOD PRESSURE: 76 MMHG | HEART RATE: 74 BPM | WEIGHT: 150 LBS | HEIGHT: 68 IN | SYSTOLIC BLOOD PRESSURE: 126 MMHG

## 2022-07-12 DIAGNOSIS — R29.898 WEAKNESS OF BOTH LOWER EXTREMITIES: Primary | ICD-10-CM

## 2022-07-12 DIAGNOSIS — R29.898 LEFT HAND WEAKNESS: ICD-10-CM

## 2022-07-12 DIAGNOSIS — G95.9 MYELOPATHY (HCC): ICD-10-CM

## 2022-07-12 PROCEDURE — 3008F BODY MASS INDEX DOCD: CPT | Performed by: OTHER

## 2022-07-12 PROCEDURE — 99204 OFFICE O/P NEW MOD 45 MIN: CPT | Performed by: OTHER

## 2022-07-12 PROCEDURE — 3074F SYST BP LT 130 MM HG: CPT | Performed by: OTHER

## 2022-07-12 PROCEDURE — 3078F DIAST BP <80 MM HG: CPT | Performed by: OTHER

## 2022-07-12 NOTE — PROGRESS NOTES
Mr. Leblanc, relates over Father's Day he had couple episodes of lower extremity weakness lasting couple minutes. Also associated with a burning sensation in the right posterior leg to buttocks. Left carpal tunnel symptoms. Left lateral epicondylitis. 1 episode of a fall with lower lumbar spine pain which resolved. At present time he denies headache cervical thoracic lumbar spine pain. No bowel bladder dysfunction. He denies any weakness in the legs at the present time. No visual symptoms cranial nerve symptoms. He works at the [employer]    Review of system -12 years    Exam pupils react to light accommodation. Optic disc flat. Visual fields are full. Cranial nerves normal.  No SAUNDRA. No Jeppie Olmstead phenomena. Strength in arms and legs normal.  Slight decreased alternating motion rates in left hand. APB strength normal.  Deep tendon reflexes are symmetric in the upper extremities. Left patellar reflex is significantly brisker than the right. No Babinski signs. Joint position sense is normal.  Tone is normal.  Finger-nose normal.  Gait is normal.    Impression: Although the findings are subtle, very brisk reflex in the left leg, decreased alternating motion rates in left hand, history, we will proceed with MRI of the brain cervical thoracic spine. Possibility of multiple sclerosis. With next blood test would recommend primary care doctor obtain vitamin B12 level. I will call him with results of the MRI scan. Asked him to call the office 2-3 days after completing the MRI scans.

## 2022-07-20 ENCOUNTER — NURSE ONLY (OUTPATIENT)
Dept: ALLERGY | Facility: CLINIC | Age: 32
End: 2022-07-20
Payer: COMMERCIAL

## 2022-07-20 ENCOUNTER — OFFICE VISIT (OUTPATIENT)
Dept: ALLERGY | Facility: CLINIC | Age: 32
End: 2022-07-20
Payer: COMMERCIAL

## 2022-07-20 VITALS
WEIGHT: 150 LBS | HEART RATE: 76 BPM | HEIGHT: 68 IN | SYSTOLIC BLOOD PRESSURE: 117 MMHG | DIASTOLIC BLOOD PRESSURE: 77 MMHG | OXYGEN SATURATION: 97 % | BODY MASS INDEX: 22.73 KG/M2

## 2022-07-20 DIAGNOSIS — Z28.21 COVID-19 VACCINE DOSE DECLINED: ICD-10-CM

## 2022-07-20 DIAGNOSIS — Z88.9 DRUG ALLERGY: ICD-10-CM

## 2022-07-20 DIAGNOSIS — T78.40XA ALLERGY, INITIAL ENCOUNTER: ICD-10-CM

## 2022-07-20 DIAGNOSIS — T50.905A ADVERSE EFFECT OF DRUG, INITIAL ENCOUNTER: ICD-10-CM

## 2022-07-20 DIAGNOSIS — Z91.018 FOOD ALLERGY: Primary | ICD-10-CM

## 2022-07-20 PROCEDURE — 95004 PERQ TESTS W/ALRGNC XTRCS: CPT | Performed by: ALLERGY & IMMUNOLOGY

## 2022-07-20 PROCEDURE — 3074F SYST BP LT 130 MM HG: CPT | Performed by: ALLERGY & IMMUNOLOGY

## 2022-07-20 PROCEDURE — 3078F DIAST BP <80 MM HG: CPT | Performed by: ALLERGY & IMMUNOLOGY

## 2022-07-20 PROCEDURE — 3008F BODY MASS INDEX DOCD: CPT | Performed by: ALLERGY & IMMUNOLOGY

## 2022-07-20 PROCEDURE — 99204 OFFICE O/P NEW MOD 45 MIN: CPT | Performed by: ALLERGY & IMMUNOLOGY

## 2022-07-20 RX ORDER — EPINEPHRINE 0.3 MG/.3ML
INJECTION SUBCUTANEOUS
Qty: 1 EACH | Refills: 0 | Status: SHIPPED | OUTPATIENT
Start: 2022-07-20

## 2022-07-20 NOTE — PATIENT INSTRUCTIONS
#1 Food allergy  See above clinical history. Itchy mouth with bananas and shellfish including shrimp in the past.  Prior papular rash 1 to 2 days after chocolate by history. No hives lip swelling or tongue swelling or respiratory issues. No prior ED visits or EpiPen usage. See above skin testing to select foods based on clinical history as well as common food allergens to screen for an IgE mediated process  Recommend to avoid those foods that are positive on skin testing  Handouts on oral allergy syndrome provided and reviewed getting itchy mouth with bananas. Handouts on food allergies versus food intolerances provided and reviewed. His symptoms with chocolate and timing suggest more of a food intolerance  Recommend to avoid shellfish. EpiPen prescription provided and reviewed and teaching provided. EpiPen and Benadryl as needed based upon symptom severity and event of allergic reaction. Reviewed indications to use EpiPen    #2 adverse drug reaction/drug allergy  Continue to avoid Tylenol. Patient tolerating ibuprofen and Aleve without issues  Patient avoiding Pepcid at this time. Currently controlling GERD with dietary measures. May consider Prevacid or Prilosec for reflux medications should heartburn/reflux worsen. Reviewed no skin testing to Pepcid or Tylenol at this time to evaluate for an allergic process. May consider oral challenge in the future to these medications to further evaluate  Patient and chooses avoidance of these medications at this time and use of alternative medications. #3 COVID-vaccine deferred.     Over 45 spent on care and visit

## 2022-08-23 ENCOUNTER — HOSPITAL ENCOUNTER (OUTPATIENT)
Dept: MRI IMAGING | Facility: HOSPITAL | Age: 32
Discharge: HOME | End: 2022-08-23
Attending: Other
Payer: COMMERCIAL

## 2022-08-23 DIAGNOSIS — G95.9 MYELOPATHY (HCC): ICD-10-CM

## 2022-08-23 DIAGNOSIS — R29.898 WEAKNESS OF BOTH LOWER EXTREMITIES: ICD-10-CM

## 2022-08-23 DIAGNOSIS — R29.898 LEFT HAND WEAKNESS: ICD-10-CM

## 2022-08-23 PROCEDURE — 72146 MRI CHEST SPINE W/O DYE: CPT | Performed by: OTHER

## 2022-08-23 PROCEDURE — 72141 MRI NECK SPINE W/O DYE: CPT | Performed by: OTHER

## 2022-08-23 PROCEDURE — 70551 MRI BRAIN STEM W/O DYE: CPT | Performed by: OTHER

## 2022-08-24 ENCOUNTER — TELEPHONE (OUTPATIENT)
Dept: NEUROLOGY | Facility: CLINIC | Age: 32
End: 2022-08-24

## 2022-08-24 NOTE — TELEPHONE ENCOUNTER
Please call the patient and ask him to return to the office to discuss the results of the MRI scans.

## 2022-08-29 NOTE — TELEPHONE ENCOUNTER
Spoke to patient and scheduled follow-up for 8/31 at 1:30 pm with Dr. Dre Griggs at [facility] office. He was very thankful for the call and appointment.

## 2022-08-31 ENCOUNTER — OFFICE VISIT (OUTPATIENT)
Dept: NEUROLOGY | Facility: CLINIC | Age: 32
End: 2022-08-31
Payer: COMMERCIAL

## 2022-08-31 VITALS
BODY MASS INDEX: 22.73 KG/M2 | HEART RATE: 74 BPM | HEIGHT: 68 IN | SYSTOLIC BLOOD PRESSURE: 118 MMHG | WEIGHT: 150 LBS | DIASTOLIC BLOOD PRESSURE: 66 MMHG

## 2022-08-31 DIAGNOSIS — G95.9 MYELOPATHY (HCC): ICD-10-CM

## 2022-08-31 DIAGNOSIS — R29.898 WEAKNESS OF BOTH LOWER EXTREMITIES: Primary | ICD-10-CM

## 2022-08-31 PROCEDURE — 3074F SYST BP LT 130 MM HG: CPT | Performed by: OTHER

## 2022-08-31 PROCEDURE — 3078F DIAST BP <80 MM HG: CPT | Performed by: OTHER

## 2022-08-31 PROCEDURE — 3008F BODY MASS INDEX DOCD: CPT | Performed by: OTHER

## 2022-08-31 PROCEDURE — 99214 OFFICE O/P EST MOD 30 MIN: CPT | Performed by: OTHER

## 2022-08-31 NOTE — PROGRESS NOTES
Mr. Leblanc, was in the office with his father. He relates no new neurological symptoms. 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 Still has numbness paresthesias in the left fourth and fifth digit occasional sensory symptoms in the legs, slight weakness in the legs. No headache visual symptoms. No change speech or language. No cranial nerve symptoms. No upper extremity symptoms. Repeat neurological examination was unchanged from original consultation. I did review the MRI images of the brain, cervical spine. We will repeat MRI of the brain, cervical spine pre and postcontrast.  Asked him to call the office couple days after completing the test.  At that time consider possible neurosurgery referral for evaluation of the left anterior temporal lobe lesion, spinal tap to investigate possibility inflammatory changes changes. I did relate the MRI of the brain is not consistent with multiple sclerosis. Intramedullary lesion of the cervical spine could be inflammatory or neoplastic. He relates he had COVID in December. He has not had COVID vaccination.

## 2022-09-20 ENCOUNTER — HOSPITAL ENCOUNTER (OUTPATIENT)
Dept: MRI IMAGING | Facility: HOSPITAL | Age: 32
Discharge: HOME | End: 2022-09-20
Attending: Other

## 2022-09-20 DIAGNOSIS — G95.9 MYELOPATHY (HCC): ICD-10-CM

## 2022-09-20 DIAGNOSIS — R29.898 WEAKNESS OF BOTH LOWER EXTREMITIES: ICD-10-CM

## 2022-09-20 PROCEDURE — 72141 MRI NECK SPINE W/O DYE: CPT | Performed by: OTHER

## 2022-09-20 PROCEDURE — 70551 MRI BRAIN STEM W/O DYE: CPT | Performed by: OTHER

## 2022-09-21 ENCOUNTER — TELEPHONE (OUTPATIENT)
Dept: NEUROLOGY | Facility: CLINIC | Age: 32
End: 2022-09-21

## 2022-09-21 DIAGNOSIS — G95.9 MYELOPATHY (HCC): ICD-10-CM

## 2022-09-21 DIAGNOSIS — R29.898 WEAKNESS OF BOTH LOWER EXTREMITIES: Primary | ICD-10-CM

## 2022-09-21 NOTE — TELEPHONE ENCOUNTER
Please tell him it would be very helpful to repeat the MRI images with contrast.  This will help in the diagnosis and whether spinal tap is indicated. Order signed.

## 2022-09-21 NOTE — TELEPHONE ENCOUNTER
Call the patient tell him I reviewed the MRI report of the head. Tell the patient that contrast administration would have been helpful. Please ask him why refusing contrast ministration. Tell him the next step would be a spinal tap to investigate for the possibility of multiple sclerosis. Ask him if he wants to proceed with a spinal tap.

## 2022-09-21 NOTE — TELEPHONE ENCOUNTER
I spoke with the patient and informed him of the information below. The patient stated that after reviewing the side effects of the contrast, he decided not to proceed. The patient did state that he is willing to do the MRI's again with contrast if that would give a better view before proceeding with the spinal tap. Message to Dr. Dre Griggs to please advise. Orders are pending for you to view and sign if agreeable. Thanks. Reviewed and electronically signed by:  [address], [facility]

## 2022-09-21 NOTE — TELEPHONE ENCOUNTER
Detailed message has been left on the patients voicemail informing him to contact the office to review his results. Call back number provided within the message. Reviewed and electronically signed by:  500 [facility], [address] [place] [address], [facility]

## 2022-09-21 NOTE — TELEPHONE ENCOUNTER
I spoke with the patient and informed him of the information below. Pt verbalized understanding. Reviewed and electronically signed by:  [address], [facility]

## 2022-09-26 ENCOUNTER — PATIENT MESSAGE (OUTPATIENT)
Dept: PHYSICAL MEDICINE AND REHAB | Facility: CLINIC | Age: 32
End: 2022-09-26

## 2022-09-30 ENCOUNTER — HOSPITAL ENCOUNTER (EMERGENCY)
Facility: HOSPITAL | Age: 32
Discharge: HOME | End: 2022-09-30
Attending: EMERGENCY MEDICINE
Payer: COMMERCIAL

## 2022-09-30 VITALS
OXYGEN SATURATION: 96 % | TEMPERATURE: 98 F | DIASTOLIC BLOOD PRESSURE: 80 MMHG | HEART RATE: 84 BPM | RESPIRATION RATE: 16 BRPM | SYSTOLIC BLOOD PRESSURE: 134 MMHG

## 2022-09-30 DIAGNOSIS — G56.22 ULNAR NERVE PALSY OF LEFT UPPER EXTREMITY: Primary | ICD-10-CM

## 2022-09-30 DIAGNOSIS — T50.905A MEDICATION REACTION, INITIAL ENCOUNTER: ICD-10-CM

## 2022-09-30 DIAGNOSIS — F41.9 ANXIETY: ICD-10-CM

## 2022-09-30 PROCEDURE — 99283 EMERGENCY DEPT VISIT LOW MDM: CPT

## 2022-09-30 RX ORDER — HYDROXYZINE HYDROCHLORIDE 25 MG/1
25 TABLET, FILM COATED ORAL EVERY 4 HOURS PRN
Qty: 30 TABLET | Refills: 0 | Status: SHIPPED | OUTPATIENT
Start: 2022-09-30 | End: 2022-10-30

## 2022-09-30 NOTE — ED INITIAL ASSESSMENT (HPI)
Pt to ED for complaint of fatigue and dehydration since Monday, pt feels it may be an adverse reaction due to starting one a day vitamins over the weekend.

## 2022-10-25 ENCOUNTER — HOSPITAL ENCOUNTER (OUTPATIENT)
Dept: MRI IMAGING | Facility: HOSPITAL | Age: 32
Discharge: HOME | End: 2022-10-25
Attending: Other
Payer: COMMERCIAL

## 2022-10-25 DIAGNOSIS — R29.898 WEAKNESS OF BOTH LOWER EXTREMITIES: ICD-10-CM

## 2022-10-25 DIAGNOSIS — G95.9 MYELOPATHY (HCC): ICD-10-CM

## 2022-10-25 PROCEDURE — A9575 INJ GADOTERATE MEGLUMI 0.1ML: HCPCS | Performed by: OTHER

## 2022-10-25 PROCEDURE — 72156 MRI NECK SPINE W/O & W/DYE: CPT | Performed by: OTHER

## 2022-10-25 PROCEDURE — 70553 MRI BRAIN STEM W/O & W/DYE: CPT | Performed by: OTHER

## 2022-10-25 RX ORDER — GADOTERATE MEGLUMINE 376.9 MG/ML
15 INJECTION INTRAVENOUS
Status: COMPLETED | OUTPATIENT
Start: 2022-10-25 | End: 2022-10-25

## 2022-10-25 RX ADMIN — GADOTERATE MEGLUMINE 15 ML: 376.9 INJECTION INTRAVENOUS at 17:41:00

## 2022-10-25 RX ADMIN — GADOTERATE MEGLUMINE 15 ML: 376.9 INJECTION INTRAVENOUS at 17:42:00

## 2022-10-26 ENCOUNTER — TELEPHONE (OUTPATIENT)
Dept: NEUROLOGY | Facility: CLINIC | Age: 32
End: 2022-10-26

## 2022-10-26 NOTE — TELEPHONE ENCOUNTER
Please call the patient tell him I reviewed the MRI of the brain, cervical spine report. Radiologist raises the possibility of multiple sclerosis. Tell him I would recommend a spinal tap to confirm the diagnosis and then to suggest treatment options. If you like to discuss this, I recommend that he return to the office to discuss spinal tap and future treatment options.

## 2022-10-26 NOTE — TELEPHONE ENCOUNTER
Please tell the patient reviewed the MRI of the brain, cervical spine. Please asked him to return to the office to discuss spinal tap, treatment options.

## 2022-10-27 ENCOUNTER — TELEPHONE (OUTPATIENT)
Dept: PHYSICAL MEDICINE AND REHAB | Facility: CLINIC | Age: 32
End: 2022-10-27

## 2022-10-27 ENCOUNTER — ORDER TRANSCRIPTION (OUTPATIENT)
Dept: ADMINISTRATIVE | Facility: HOSPITAL | Age: 32
End: 2022-10-27

## 2022-10-27 ENCOUNTER — OFFICE VISIT (OUTPATIENT)
Dept: NEUROLOGY | Facility: CLINIC | Age: 32
End: 2022-10-27
Payer: COMMERCIAL

## 2022-10-27 VITALS
HEART RATE: 70 BPM | SYSTOLIC BLOOD PRESSURE: 116 MMHG | HEIGHT: 68 IN | DIASTOLIC BLOOD PRESSURE: 64 MMHG | BODY MASS INDEX: 22.73 KG/M2 | WEIGHT: 150 LBS

## 2022-10-27 DIAGNOSIS — Z01.818 PREPROCEDURAL EXAMINATION: Primary | ICD-10-CM

## 2022-10-27 DIAGNOSIS — Z20.822 ENCOUNTER FOR PREOPERATIVE SCREENING LABORATORY TESTING FOR COVID-19 VIRUS: ICD-10-CM

## 2022-10-27 DIAGNOSIS — G35 MULTIPLE SCLEROSIS (HCC): Primary | ICD-10-CM

## 2022-10-27 DIAGNOSIS — Z01.812 ENCOUNTER FOR PREOPERATIVE SCREENING LABORATORY TESTING FOR COVID-19 VIRUS: ICD-10-CM

## 2022-10-27 DIAGNOSIS — G35 MS (MULTIPLE SCLEROSIS) (HCC): Primary | ICD-10-CM

## 2022-10-27 PROCEDURE — 3074F SYST BP LT 130 MM HG: CPT | Performed by: OTHER

## 2022-10-27 PROCEDURE — 99214 OFFICE O/P EST MOD 30 MIN: CPT | Performed by: OTHER

## 2022-10-27 PROCEDURE — 3008F BODY MASS INDEX DOCD: CPT | Performed by: OTHER

## 2022-10-27 PROCEDURE — 3078F DIAST BP <80 MM HG: CPT | Performed by: OTHER

## 2022-10-27 NOTE — TELEPHONE ENCOUNTER
Initiated authorization for Lumbar Puncture CPT 26774 with Carmen Simmons at [facility]  Case #[id number].   Status: Approved-authorization is not required per health plan    Patient is scheduled 11/3/22

## 2022-10-27 NOTE — PROGRESS NOTES
Scheduled patient for Covid test, and spinal tap. Discussed with central scheduling and gave patient all information. They were understanding and agreeable to times and dates. Given number for CS in case they need to reschedule.

## 2022-10-27 NOTE — PROGRESS NOTES
Mr. Leblanc was in the office with his dad. He relates at the present time no new neurological symptoms. I reviewed the results of the MRI of the brain, cervical spine with patient, tiffanie. We will arrange for him to have a spinal tap with radiologist.  Obtain ARLETTE virus serology. Told him to return to the office 2 weeks after spinal tap to discuss results, treatment options    Review of system -12 fields    He does relate two distant cousins had multiple sclerosis. Exam: Visual fields full. Cranial nerves normal.  No SAUNDRA. Strength in arms and legs is normal except for slight hip flexion weakness. Minimal trouble rising from a chair without using arms. Finger-nose normal.  Gait is normal.    Impression: Multiple sclerosis.

## 2022-10-31 ENCOUNTER — LAB ENCOUNTER (OUTPATIENT)
Dept: LAB | Facility: HOSPITAL | Age: 32
End: 2022-10-31
Attending: Other
Payer: COMMERCIAL

## 2022-10-31 DIAGNOSIS — Z01.818 PREPROCEDURAL EXAMINATION: ICD-10-CM

## 2022-10-31 DIAGNOSIS — Z01.812 ENCOUNTER FOR PREOPERATIVE SCREENING LABORATORY TESTING FOR COVID-19 VIRUS: ICD-10-CM

## 2022-10-31 DIAGNOSIS — Z20.822 ENCOUNTER FOR PREOPERATIVE SCREENING LABORATORY TESTING FOR COVID-19 VIRUS: ICD-10-CM

## 2022-10-31 LAB — SARS-COV-2 RNA RESP QL NAA+PROBE: NOT DETECTED

## 2022-11-03 ENCOUNTER — HOSPITAL ENCOUNTER (OUTPATIENT)
Dept: GENERAL RADIOLOGY | Facility: HOSPITAL | Age: 32
Discharge: HOME | End: 2022-11-03
Attending: Other
Payer: COMMERCIAL

## 2022-11-03 VITALS — SYSTOLIC BLOOD PRESSURE: 113 MMHG | DIASTOLIC BLOOD PRESSURE: 73 MMHG

## 2022-11-03 DIAGNOSIS — G35 MULTIPLE SCLEROSIS (HCC): ICD-10-CM

## 2022-11-03 DIAGNOSIS — G35 MS (MULTIPLE SCLEROSIS) (HCC): ICD-10-CM

## 2022-11-03 LAB
BASOPHILS NFR CSF: 0 %
COLOR CSF: COLORLESS
EOSINOPHIL NFR CSF: 0 %
GLUCOSE CSF-MCNC: 61 MG/DL (ref 40–70)
LYMPHOCYTES NFR CSF: 96 % (ref 40–80)
MONOS+MACROS NFR CSF: 4 % (ref 15–45)
NEUTROPHILS NFR CSF: 0 % (ref 0–6)
PROT PATTERN CSF ELPH-IMP: 28.4 MG/DL (ref 15–45)
RBC # CSF: 13 /CUMM (ref ?–1)
TOTAL CELLS COUNTED CSF: 42 /CUMM (ref 0–5)
TOTAL CELLS COUNTED FLD: 100
TOTAL VOLUME CSF: 8.5 ML
TUBE # CSF: 3
TURBIDITY CSF QL: CLEAR
WBC CALC (IRIS) CSF: 42 /CUMM

## 2022-11-03 PROCEDURE — 88160 CYTOPATH SMEAR OTHER SOURCE: CPT

## 2022-11-03 PROCEDURE — 84165 PROTEIN E-PHORESIS SERUM: CPT

## 2022-11-03 PROCEDURE — 82042 OTHER SOURCE ALBUMIN QUAN EA: CPT

## 2022-11-03 PROCEDURE — 87205 SMEAR GRAM STAIN: CPT

## 2022-11-03 PROCEDURE — 62328 DX LMBR SPI PNXR W/FLUOR/CT: CPT | Performed by: OTHER

## 2022-11-03 PROCEDURE — 87070 CULTURE OTHR SPECIMN AEROBIC: CPT

## 2022-11-03 PROCEDURE — 89050 BODY FLUID CELL COUNT: CPT

## 2022-11-03 PROCEDURE — 82040 ASSAY OF SERUM ALBUMIN: CPT

## 2022-11-03 PROCEDURE — 89051 BODY FLUID CELL COUNT: CPT

## 2022-11-03 PROCEDURE — 82784 ASSAY IGA/IGD/IGG/IGM EACH: CPT

## 2022-11-03 PROCEDURE — 82945 GLUCOSE OTHER FLUID: CPT

## 2022-11-03 PROCEDURE — 83916 OLIGOCLONAL BANDS: CPT

## 2022-11-03 PROCEDURE — 84157 ASSAY OF PROTEIN OTHER: CPT

## 2022-11-03 NOTE — DISCHARGE INSTRUCTIONS
Procedural Physician: Dr. Rashid Berg    Travel/Activity  Go home and rest quietly until the next day. Sleep in the supine position  Avoid frequent repositioning  You may get up to use the restroom  Do not drive today. Resume light activity tomorrow. You may return to work tomorrow. Diet  6. No dietary restrictions. Drink extra liquids today; one cup every hour for eight hours. Medications  7. You may resume any prescription medications taken before the lumbar puncture. However, DO NOT take aspirin, motrin, ibuprofen, or any medications containing NSAIDS for 24 hours. 8.  Contact attending physician for resumption of anticoagulants, coumadin, and/or aspirin therapy. Side Effects  10. Most common - Headaches    Headaches that occur within the first few hours can be treated with tylenol and positioning. Headaches that occur over the next 24 hours (second day) and last for several days are referred to as \"Spinal Headaches\". *Symptoms are usually headache pain, eye discomfort, and nausea. Position change from a lying to a sitting or standing position usually increases the discomfort. First line of treatment is conservative care: Return to bed rest; lie flat, start Tylenol every 4 hours. Drink large volumes of fluid containing caffeine over the next 24 hours. If symptoms are reduced or manageable, continue this plan of care. If headache is persistent after 24 to 48 hours, notify the ordering physician. If your headache is extreme and persistent, go to your local emergency department, explain the procedure you had and the symptoms you are experiencing. Treatment will be provided accordingly. Pain at the injection site is quite common and usually resolves itself over 24 - 48 hours. Continue treatment with Tylenol. Call your physician for any further questions, problems, or test results.  They may contact the Radiology Department for further information by asking to speak with a Radiology Nurse at [phone].

## 2022-11-03 NOTE — IMAGING NOTE
PATIENT BROUGHT FROM XRAY AT 1400 FROM LP WITH DR DE LEON. PROCEDURE FINISHED AT 1350. BANDAIDE C/D/I.   DOCTORS NOTE GIVEN TO PT AND FAXED TO WORK BY PT REQUEST. \    1500: DISCHARGE INSTRUCTIONS GIVEN, PT STATES UNDERSTANDING. WHEELED OUT TO MAIN ENTRANCE WHERE FRIEND IS WAITING TO TAKE PT HOME.

## 2022-11-03 NOTE — IMAGING NOTE
Met pt in Radiolgy exam room, pre-lumbar puncture. Confirmed name//allergies/meds. No recent blood thinner-type medications. /72, HR 74. Informed pt he will be recovered in our holding area, he understands.

## 2022-11-07 LAB
ALBUMIN, CSF: 11.2 MG/DL
ALPHA-1, CSF: 0.4 MG/DL
ALPHA-2, CSF: 1.1 MG/DL
BETA, CSF: 2.7 MG/DL
GAMMA, CSF: 3.5 MG/DL
PRE-ALBUMIN, CSF: 1.2 MG/DL
TOTAL PROTEIN, CSF: 20.1 MG/DL

## 2022-11-09 LAB
ALBUMIN, CSF: 12 MG/DL
CSF IGG/ALBUMIN RATIO: 0.42 RATIO
IMMUNOGLOBULIN G CSF: 5 MG/DL

## 2022-12-07 ENCOUNTER — OFFICE VISIT (OUTPATIENT)
Dept: NEUROLOGY | Facility: CLINIC | Age: 32
End: 2022-12-07
Payer: COMMERCIAL

## 2022-12-07 ENCOUNTER — LAB ENCOUNTER (OUTPATIENT)
Dept: LAB | Facility: HOSPITAL | Age: 32
End: 2022-12-07
Attending: Other
Payer: COMMERCIAL

## 2022-12-07 ENCOUNTER — TELEPHONE (OUTPATIENT)
Dept: NEUROLOGY | Facility: CLINIC | Age: 32
End: 2022-12-07

## 2022-12-07 VITALS
DIASTOLIC BLOOD PRESSURE: 68 MMHG | SYSTOLIC BLOOD PRESSURE: 118 MMHG | WEIGHT: 150 LBS | BODY MASS INDEX: 22.73 KG/M2 | HEIGHT: 68 IN

## 2022-12-07 DIAGNOSIS — G35 MS (MULTIPLE SCLEROSIS) (HCC): Primary | ICD-10-CM

## 2022-12-07 DIAGNOSIS — G35 MS (MULTIPLE SCLEROSIS) (HCC): ICD-10-CM

## 2022-12-07 PROCEDURE — 87798 DETECT AGENT NOS DNA AMP: CPT

## 2022-12-07 PROCEDURE — 99213 OFFICE O/P EST LOW 20 MIN: CPT | Performed by: OTHER

## 2022-12-07 PROCEDURE — 3078F DIAST BP <80 MM HG: CPT | Performed by: OTHER

## 2022-12-07 PROCEDURE — 3074F SYST BP LT 130 MM HG: CPT | Performed by: OTHER

## 2022-12-07 PROCEDURE — 36415 COLL VENOUS BLD VENIPUNCTURE: CPT

## 2022-12-07 PROCEDURE — 3008F BODY MASS INDEX DOCD: CPT | Performed by: OTHER

## 2022-12-07 NOTE — TELEPHONE ENCOUNTER
Patient seen in office today. Dimethyl fumerate start form filled out. Patient reviewed and signed. Dr. Anahi Tubbs reviewed and signed. Form faxed to [employer]. Process to start explained to patient. In the meantime patient will get JCV lab done today. Lab to be done prior to starting dimethyl fumerate per Dr. Anahi Tubbs. PA for dimethyl fumerate starter pack and maintenance dose of 240 mg BID completed today. Will await determination to be faxed to office.

## 2022-12-07 NOTE — TELEPHONE ENCOUNTER
Patient dropped off FMLA forms to office for intermittent time off. He states that this will cover him if he needs to leave work if he starts to have any episodes of weakness. He is a . Forms filled out and placed on Dr. Luz Elena Ardon descindy for review and signature.

## 2022-12-07 NOTE — PROGRESS NOTES
Starla Leblanc, was in the office with his father. At the present time no neurological symptoms. 1 week history of poor spinal headache. Also had couple days of lower extremity weakness which resolved. At present time no headache. No focal motor, sensory symptoms in the arms or legs. Unfortunately, with a spinal tap was performed by radiologist, they did not obtain any blood samples to be able to perform IgG index. There were 2 oligoclonal bands. IgG was elevated. Based on MRI scan, I believe it is reasonable to proceed treating for MS. We will proceed with ARLETTE virus serology today. If that comes back negative we will start him on Tecfidera. He wants to drop off some family leave forms. Told him to return to the office 1 month after starting Tecfidera. Discussed the pathophysiology of MS, autoimmune disease. He inquired about exercising. I discussed Uhthoff's phenomena. Exam: Visual fields are full. No SAUNDRA. Cranial nerves normal.  Speech and language intact. Strength in arms and legs normal.  Finger-nose normal.  Gait is normal.  Tandem gait is normal.  Romberg sign is negative.

## 2022-12-08 NOTE — TELEPHONE ENCOUNTER
Completed forms at  for patient to . Left message for patient notifying him that forms were ready for . Asked him to call the office if he had questions. [Follow-Up] : a follow-up visit [Abnormal CXR/ Chest CT] : an abnormal CXR/ chest CT

## 2022-12-08 NOTE — TELEPHONE ENCOUNTER
Received PA Approval letter.     Dimethyl fumarate initiation 240mg/starter pack approved from 11/7/22-12/7/23    Letter placed into scanning

## 2022-12-12 ENCOUNTER — TELEPHONE (OUTPATIENT)
Dept: NEUROLOGY | Facility: CLINIC | Age: 32
End: 2022-12-12

## 2022-12-12 LAB — JC VIRUS BY PCR: NOT DETECTED

## 2022-12-12 NOTE — TELEPHONE ENCOUNTER
Please call the patient tell him that his ARLETTE virus is negative. This is good news. Tell him that he may proceed with starting the medication.

## 2022-12-12 NOTE — TELEPHONE ENCOUNTER
Spoke to patient and notified him of below. He was understanding and thankful for information. He has not received a call from pharmacy regarding dimethyl fumerate (which was approved). He will call back by end of week if he does not hear from pharmacy.

## 2023-04-25 ENCOUNTER — OFFICE VISIT (OUTPATIENT)
Dept: NEUROLOGY | Facility: CLINIC | Age: 33
End: 2023-04-25
Payer: COMMERCIAL

## 2023-04-25 VITALS — HEIGHT: 68 IN | WEIGHT: 150 LBS | BODY MASS INDEX: 22.73 KG/M2

## 2023-04-25 DIAGNOSIS — G35 MS (MULTIPLE SCLEROSIS) (HCC): Primary | ICD-10-CM

## 2023-04-25 DIAGNOSIS — G56.22 ULNAR NEUROPATHY AT ELBOW OF LEFT UPPER EXTREMITY: ICD-10-CM

## 2023-04-25 PROCEDURE — 99214 OFFICE O/P EST MOD 30 MIN: CPT | Performed by: OTHER

## 2023-04-25 PROCEDURE — 3008F BODY MASS INDEX DOCD: CPT | Performed by: OTHER

## 2023-04-28 ENCOUNTER — PATIENT MESSAGE (OUTPATIENT)
Dept: NEUROLOGY | Facility: CLINIC | Age: 33
End: 2023-04-28

## 2023-05-01 NOTE — TELEPHONE ENCOUNTER
From: Justin FULLER May  To: Sharlene Murphy MD  Sent: 4/28/2023 3:09 PM CDT  Subject: FMLA Forms Need Signing    Hey I needed my FMLA forms signed for work, for just incase emergencies if I have any reactions too my symptoms for M.S. You Can Copy and paste or click this link too open the forms. It wouldn't let me attach it. It's in PDF Form. [email or web address]

## 2023-06-18 ENCOUNTER — PATIENT MESSAGE (OUTPATIENT)
Dept: NEUROLOGY | Facility: CLINIC | Age: 33
End: 2023-06-18

## 2023-06-19 NOTE — TELEPHONE ENCOUNTER
Noted.  I will send the message to the patient and remind him to have the lab testing completed. Reviewed and electronically signed by:  500 [facility], [address] [place] [address], [facility]

## 2023-06-19 NOTE — TELEPHONE ENCOUNTER
Please see the other [employer] message as well. The patient has decided to start Sidumula 30. Start form has been completed and is awaiting the patients signature. Reviewed and electronically signed by:  500 [facility], [address] [place] [address], [facility]

## 2023-06-19 NOTE — TELEPHONE ENCOUNTER
From: Justin FULLER May  To: Nayeli Soriano MD  Sent: 6/18/2023 2:54 PM CDT  Subject: Medication Request    Hey I had one more follow up question, instead of the doctor visit can Dr. Ai Freedman put in a request for me too take the once a month self injection for my MS condition? Marcos Westfall

## 2023-06-19 NOTE — TELEPHONE ENCOUNTER
He is supposed to do the blood work before he starts injecting medication. It was ordered last time he saw me in April.

## 2023-06-21 ENCOUNTER — LAB ENCOUNTER (OUTPATIENT)
Dept: LAB | Facility: HOSPITAL | Age: 33
End: 2023-06-21
Attending: Other
Payer: COMMERCIAL

## 2023-06-21 DIAGNOSIS — G35 MS (MULTIPLE SCLEROSIS) (HCC): ICD-10-CM

## 2023-06-21 LAB
HBV CORE AB SERPL QL IA: NONREACTIVE
HBV SURFACE AB SER QL: REACTIVE
HBV SURFACE AB SERPL IA-ACNC: 125.91 MIU/ML
HBV SURFACE AG SER-ACNC: <0.1 [IU]/L
HBV SURFACE AG SERPL QL IA: NONREACTIVE
IGA SERPL-MCNC: 368 MG/DL (ref 70–312)
IGM SERPL-MCNC: 68.9 MG/DL (ref 43–279)
IMMUNOGLOBULIN PNL SER-MCNC: 1260 MG/DL (ref 791–1643)
VIT D+METAB SERPL-MCNC: 11.7 NG/ML (ref 30–100)

## 2023-06-21 PROCEDURE — 87340 HEPATITIS B SURFACE AG IA: CPT

## 2023-06-21 PROCEDURE — 86711 JOHN CUNNINGHAM ANTIBODY: CPT

## 2023-06-21 PROCEDURE — 86480 TB TEST CELL IMMUN MEASURE: CPT

## 2023-06-21 PROCEDURE — 86706 HEP B SURFACE ANTIBODY: CPT

## 2023-06-21 PROCEDURE — 86704 HEP B CORE ANTIBODY TOTAL: CPT

## 2023-06-21 PROCEDURE — 80503 PATH CLIN CONSLTJ SF 5-20: CPT

## 2023-06-21 PROCEDURE — 36415 COLL VENOUS BLD VENIPUNCTURE: CPT

## 2023-06-21 PROCEDURE — 86787 VARICELLA-ZOSTER ANTIBODY: CPT

## 2023-06-21 PROCEDURE — 82784 ASSAY IGA/IGD/IGG/IGM EACH: CPT

## 2023-06-21 PROCEDURE — 82306 VITAMIN D 25 HYDROXY: CPT

## 2023-06-21 NOTE — TELEPHONE ENCOUNTER
Form has been signed and placed on Dr. Edwards Kind desk to review and sign if agreeable. Labs have been completed and are currently in process. Reviewed and electronically signed by:  500 [facility], [address] [place] [address], [facility]

## 2023-06-22 ENCOUNTER — TELEPHONE (OUTPATIENT)
Dept: NEUROLOGY | Facility: CLINIC | Age: 33
End: 2023-06-22

## 2023-06-22 LAB
M TB IFN-G CD4+ T-CELLS BLD-ACNC: 0 IU/ML
M TB TUBERC IFN-G BLD QL: NEGATIVE
M TB TUBERC IGNF/MITOGEN IGNF CONTROL: >10 IU/ML
QFT TB1 AG MINUS NIL: 0 IU/ML
QFT TB2 AG MINUS NIL: 0.02 IU/ML

## 2023-06-22 NOTE — TELEPHONE ENCOUNTER
----- Message from Johann Peraza MD sent at 6/22/2023  3:05 PM CDT -----  Please let the patient know that results of these particular lab tests so far were normal.  No TB    Thank you

## 2023-06-22 NOTE — TELEPHONE ENCOUNTER
----- Message from Funmilayo Wilson MD sent at 6/22/2023  7:41 AM CDT -----  Please let the patient know that results of these particular lab tests so far were normal.  He is immunized against hepatitis B.     Thank you

## 2023-06-23 LAB
V ZOSTER IGM: <0.91 INDEX
VZV IGG SER IA-ACNC: 1189 (ref 165–?)

## 2023-06-23 NOTE — TELEPHONE ENCOUNTER
Form has been signed by the physician and faxed to [place] [id number]. Form has been placed in the nurse bin. Copy has been sent for scanning. Reviewed and electronically signed by:  500 [facility], [address] [place] [address], [facility]

## 2023-07-06 ENCOUNTER — TELEPHONE (OUTPATIENT)
Dept: NEUROLOGY | Facility: CLINIC | Age: 33
End: 2023-07-06

## 2023-07-06 NOTE — TELEPHONE ENCOUNTER
----- Message from Agustin Ruelas MD sent at 7/6/2023  1:04 PM CDT -----  Please let the patient know that he is positive for ARLETTE virus. Therefore his risk of possibly developing PML is probably somewhat higher than otherwise.   Though cannot stated with certainty with any specific numbers    Thank you

## 2023-07-06 NOTE — TELEPHONE ENCOUNTER
Detailed message has been left on the patients voicemail to contact the office. Call back number provided within the message. Reviewed and electronically signed by:  500 [facility], [address] [place] [address], [facility] [place] [facility]

## 2023-08-01 ENCOUNTER — OFFICE VISIT (OUTPATIENT)
Dept: NEUROLOGY | Facility: CLINIC | Age: 33
End: 2023-08-01
Payer: COMMERCIAL

## 2023-08-01 VITALS
HEIGHT: 68 IN | DIASTOLIC BLOOD PRESSURE: 68 MMHG | WEIGHT: 145 LBS | BODY MASS INDEX: 21.98 KG/M2 | SYSTOLIC BLOOD PRESSURE: 100 MMHG

## 2023-08-01 DIAGNOSIS — G35 MS (MULTIPLE SCLEROSIS) (HCC): Primary | ICD-10-CM

## 2023-08-01 PROCEDURE — 3078F DIAST BP <80 MM HG: CPT | Performed by: OTHER

## 2023-08-01 PROCEDURE — 99214 OFFICE O/P EST MOD 30 MIN: CPT | Performed by: OTHER

## 2023-08-01 PROCEDURE — 3008F BODY MASS INDEX DOCD: CPT | Performed by: OTHER

## 2023-08-01 PROCEDURE — 3074F SYST BP LT 130 MM HG: CPT | Performed by: OTHER

## 2023-08-01 NOTE — PROGRESS NOTES
Neurology Follow up Visit     Referred By: Dr. Moore ref. provider found    Chief Complaint: Patient presents with:  Multiple Sclerosis: LOV 04/25/2023 patient present for MS. Patient wanted to review his labs and discuss will he benefit from taking  Raheel Delatorre. HPI:     Justin Leblanc is a 33year old male, who presents for history of possible multiple sclerosis, diagnosed since 2022. However symptoms might have started in 2021 or 2020, when he had weakness of both lower extremities for only few seconds. Than the other time he had weakness of the right lower extremity, about the time of the left lower extremity Dr Gaona Pae for few weeks and that time he presented to the neurology and was tested with MRIs of the cervical, thoracic and brain and it did show enhancing lesion in the left hemisphere, additionally nonenhancing lesion in the spinal cord though it might have been one of them enhancing, though difficult to assess with certainty since it was a poor quality imaging. Additional nonenhancing lesions in the brainstem left and right hemispheres, some of the juxtacortical and somewhat lower periventricular. Overall burden of disease is relatively mild at that time. CSF was obtained, and while there were to oligo bands in the CSF unclear how many were in the serum that since it was not tested at that time. Patient continued with symptoms of numbness in the left arm that he felt was result of the injury to the left elbow that resulting in numbness in the last 2 fingers of the left hand. He was prescribed Tecfidera but he is never started since he was worried about side effects profile. Cell antibodies, hep B antibodies were checked, he did not have any active infections. TB was checked and he was negative. He was positive for ARLETTE virus antibodies. He still did not start Silvia Argue despite receiving prescription already. He was still quite worried about opportunistic infections  .      Past Medical History: Diagnosis Date    Asthma     Esophageal reflux     MRSA (methicillin resistant Staphylococcus aureus)        Past Surgical History:   Procedure Laterality Date    FRACTURE SURGERY         Social history:  Smoking status:   Former      Smokeless tobacco:   Never       Alcohol use   Yes      Comment:   1x month          Drug use:   Unknown         Family History   Problem Relation Age of Onset    Asthma Maternal Grandmother          Current Outpatient Medications:     EPINEPHrine (EPIPEN 2-LAURA) 0.3 MG/0.3ML Injection Solution Auto-injector, Inject IM in event of  allergic reaction (Patient taking differently: Inject IM in event of  allergic reaction), Disp: 1 each, Rfl: 0      Acetaminophen           HIVES  Doxycycline             HIVES  Omeprazole              PALPITATIONS, FATIGUE, SHORTNESS OF                           BREATH  Protonix [Pantopraz*    HIVES, FACE FLUSHING    Comment:IV injection reaction  Chocolate               OTHER (SEE COMMENTS)  Banana                  ITCHING  Shrimp                  ITCHING  Shrimp Flavor           ITCHING    ROS:   As in HPI, the rest of the 14 system review was done and was negative      Physical Exam:   08/01/23  1006   BP: 100/68   Weight: 145 lb (65.8 kg)   Height: 68\"       General: No apparent distress, well nourished, well groomed. Head- Normocephalic, atraumatic  Eyes- No redness or swelling  ENT- Hearing intake, normal glutition  Neck- No masses or adenopathy  Cv: pulses were palpable and normal, no cyanosis or edema     Neurological:     Mental Status- Alert and oriented x3. Normal attention span and concentration  Thought process intact  Memory intact- recent and remote  Mood intact  Fund of knowledge appropriate for education and age    Language intact including: comprehension, naming, repetition, vocabulary    Cranial Nerves:  VII. Face symmetric, no facial weakness  VIII. Hearing intact to whisper. IX. Pallet elevates symmetrically. XI.  Shoulder shrug is intact  XII. Tongue is midline    Motor Exam:  Muscle tone normal  No atrophy or fasciculations  Strength- upper extremities 5/5 proximally and distally                   Rapid alternating movements intact    Gait:  Normal posture  Normal physiologic      Labs:    No results found for: TSH  No results found for: CHOL, HDL, LDL, TRIG  Lab Results   Component Value Date    HGB 14.4 05/20/2022    HCT 43.9 05/20/2022    MCV 93.0 05/20/2022    WBC 5.0 05/20/2022    .0 05/20/2022      Lab Results   Component Value Date    BUN 12 05/20/2022    CA 8.7 05/20/2022     05/20/2022    K 4.0 05/20/2022     05/20/2022    CO2 26.0 05/20/2022      I have reviewed labs. Imaging Studies:  I have independently reviewed imaging. MRI of the brain, cervical and thoracic spine was independently reviewed as above. Assessment   1. MS (multiple sclerosis) (HCC)  Possible multiple sclerosis, even though burden of the disease is not relatively high, it is reasonable to start disease modifying therapy, we discussed different options, patient will think about Huy León or Tecfidera or Copaxone. Emphasized importance of disease modifying therapy to prevent further progression of the disease. Also advised to get any vaccinations done before starting any treatments especially with Zee Santos or Francie Mondragon. He will think about options and let us know. 2. Ulnar neuropathy at elbow of left upper extremity  Possible ulnar neuropathy at the left elbow, EMG will be done after occupational therapy will be tried first.       Education and counseling provided to patient. Instructed patient to call my office or seek medical attention immediately if symptoms worsen. Patient verbalized understanding of information given. All questions were answered. All side effects of drugs were discussed. Return to clinic in: Return in about 3 months (around 11/1/2023).     Vika Jeffery MD

## 2023-08-02 DIAGNOSIS — G35 MS (MULTIPLE SCLEROSIS) (HCC): Primary | ICD-10-CM

## 2023-08-02 RX ORDER — OFATUMUMAB 20 MG/.4ML
1 INJECTION, SOLUTION SUBCUTANEOUS
Qty: 4 EACH | Refills: 0 | Status: SHIPPED | OUTPATIENT
Start: 2023-08-02

## 2023-08-02 NOTE — TELEPHONE ENCOUNTER
LOV 08/01/2023 NOV not on file    Refill request for pt  Cesar Peguero, reviewed by RN and routed to provider for review.

## 2023-08-03 NOTE — TELEPHONE ENCOUNTER
Other   Case ID: [id number]      Payer: [facility] [employer]    [phone]   Your PA request has been closed. Thank you for the ePA request for Bobbyroem Arun. We have reviewed the request and based on the information submitted, no Prior Authorization is needed at this time. There is a current approval from 5/28/23 until 6/26/25. The member can check the status of their Prior Approval online at any time by going to [email or web address] Thank you. View History    Medication Being Authorized     Ofatumumab (KESIMPTA) 20 MG/0.4ML Subcutaneous Solution Auto-injector    Inject 1 Pen into the skin every 30 (thirty) days.     Dispense: 4 each Refills: 0     Start: 8/2/2023      Class: Mandy Borja

## 2023-08-07 ENCOUNTER — PATIENT MESSAGE (OUTPATIENT)
Dept: NEUROLOGY | Facility: CLINIC | Age: 33
End: 2023-08-07

## 2023-08-07 NOTE — TELEPHONE ENCOUNTER
Attempted contact with the patient x's 2 and there was no answer. Message has been left for the patient to contact the office to discuss further. Message has also been sent to the patient via [address] Reviewed and electronically signed by:  500 [address], Count includes the [facility]

## 2023-08-08 NOTE — TELEPHONE ENCOUNTER
Message to Dr. Mary Bradley as Wicho Jackson. Reviewed and electronically signed by:  500 [facility], [address] [place] [address], [facility]

## 2023-08-09 NOTE — TELEPHONE ENCOUNTER
Received a call from Brandon Richardson . Gilberto Zhao  will like to be inform of   the decision from  regarding Edmundo Virgener , if patient is switching  medication please let her know . Gilberto Zhao can be reach at [phone].

## 2023-09-06 NOTE — TELEPHONE ENCOUNTER
Taj Kirk) called again and is looking for someone to tell her that they can for sure close the case regarding the Cloria Finical.

## 2023-09-06 NOTE — TELEPHONE ENCOUNTER
I spoke with Katelynn Lee and informed her that she can close the case as the patient does not want to proceed with the treatment. Katelynn Lee verbalized understanding. Reviewed and electronically signed by:  500 [address], Pending sale to [facility]

## 2023-11-27 ENCOUNTER — PATIENT MESSAGE (OUTPATIENT)
Dept: NEUROLOGY | Facility: CLINIC | Age: 33
End: 2023-11-27

## 2023-12-05 NOTE — TELEPHONE ENCOUNTER
Duncan Chatman, can we double book the patient for a video visit to discuss further? Please advise. Thanks. Reviewed and electronically signed by:  500 [facility], [address] [place] [address], [facility]

## 2023-12-06 NOTE — TELEPHONE ENCOUNTER
D, please add the patient to Dr. Hopkins Cost schedule for a video visit. Thanks. Reviewed and electronically signed by:  500 [facility], [address] [place] [address], [facility]

## 2023-12-10 ENCOUNTER — HOSPITAL ENCOUNTER (EMERGENCY)
Facility: HOSPITAL | Age: 33
Discharge: HOME | End: 2023-12-10
Attending: EMERGENCY MEDICINE
Payer: COMMERCIAL

## 2023-12-10 VITALS
RESPIRATION RATE: 18 BRPM | OXYGEN SATURATION: 97 % | SYSTOLIC BLOOD PRESSURE: 143 MMHG | DIASTOLIC BLOOD PRESSURE: 74 MMHG | HEART RATE: 86 BPM | TEMPERATURE: 98 F

## 2023-12-10 DIAGNOSIS — T78.40XA ALLERGIC REACTION, INITIAL ENCOUNTER: Primary | ICD-10-CM

## 2023-12-10 PROCEDURE — 99283 EMERGENCY DEPT VISIT LOW MDM: CPT

## 2023-12-10 PROCEDURE — 99284 EMERGENCY DEPT VISIT MOD MDM: CPT

## 2023-12-10 RX ORDER — DIPHENHYDRAMINE HCL 25 MG
CAPSULE ORAL
Qty: 30 CAPSULE | Refills: 0 | Status: SHIPPED | OUTPATIENT
Start: 2023-12-10

## 2023-12-10 NOTE — ED INITIAL ASSESSMENT (HPI)
Pt presents to ed with c/o allergic reaction. Pt states at 6pm he was at work when his arm was itching and noticed some hives on his left arm that worsened at 830pm.  Pt states the hives are \"very faint now\"     Denies trouble breathing. No meds pta.

## 2023-12-10 NOTE — DISCHARGE INSTRUCTIONS
Please return to the ER for any worsening symptoms including but not limited to changes in mental status, throat/lip swelling, shortness of breath, etc.    PLEASE FOLLOW WITH YOUR PRIMARY CARE PHYSICIAN IN 2 DAYS. Please follow with an allergist to get allergy tested. The Emergency Department is not intended to be a substitute for an effort to provide complete medical care. The imaging, if any, have often been interpreted on a preliminary basis pending final reading by the radiologist. If your blood pressure was greater than 140/90, please have this blood pressure rechecked by your primary care provider in the next several days.

## 2023-12-11 ENCOUNTER — TELEMEDICINE (OUTPATIENT)
Dept: NEUROLOGY | Facility: CLINIC | Age: 33
End: 2023-12-11
Payer: COMMERCIAL

## 2023-12-11 ENCOUNTER — TELEPHONE (OUTPATIENT)
Dept: NEUROLOGY | Facility: CLINIC | Age: 33
End: 2023-12-11

## 2023-12-11 DIAGNOSIS — G35 MS (MULTIPLE SCLEROSIS) (HCC): Primary | ICD-10-CM

## 2023-12-11 NOTE — PROGRESS NOTES
I conducted a telehealth visit with Justin Leblanc today, 12/11/23, which was completed using two-way, real-time interactive audio and video communication. This has been done in good maritza to provide continuity of care in the best interest of the provider-patient relationship, due to the COVID -19 public health crisis/national emergency where restrictions of face-to-face office visits are ongoing. Every conscious effort was taken to allow for sufficient and adequate time to complete the visit. The patient was made aware of the limitations of the telehealth visit, including treatment limitations as no physical exam could be performed. The patient was advised to call 911 or to go to the ER in case there was an emergency. The patient was also advised of the potential privacy & security concerns related to the telehealth platform. The patient was made aware of where to find [facility] notice of privacy practices, telehealth consent form and other related consent forms and documents. which are located on the [facility] website. The patient verbally agreed to telehealth consent form, related consents and the risks discussed. Lastly, the patient confirmed that they were in [place]. Included in this visit, time may have been spent reviewing labs, medications, radiology tests and decision making. Appropriate medical decision-making and tests are ordered as detailed in the plan of care above. Coding/billing information is submitted for this visit based on complexity of care and/or time spent for the visit. Neurology Follow up Visit     Referred By: Dr. Moore ref. provider found    Chief Complaint: No chief complaint on file. HPI:     Justin Leblanc is a 29year old male, who presents for history of possible multiple sclerosis, diagnosed since 2022. However symptoms might have started in 2021 or 2020, when he had weakness of both lower extremities for only few seconds.   Than the other time he had weakness of the right lower extremity, about the time of the left lower extremity Dr Jossue Pena for few weeks and that time he presented to the neurology and was tested with MRIs of the cervical, thoracic and brain and it did show enhancing lesion in the left hemisphere, additionally nonenhancing lesion in the spinal cord though it might have been one of them enhancing, though difficult to assess with certainty since it was a poor quality imaging. Additional nonenhancing lesions in the brainstem left and right hemispheres, some of the juxtacortical and somewhat lower periventricular. Overall burden of disease is relatively mild at that time. CSF was obtained, and while there were to oligo bands in the CSF unclear how many were in the serum that since it was not tested at that time. Patient continued with symptoms of numbness in the left arm that he felt was result of the injury to the left elbow that resulting in numbness in the last 2 fingers of the left hand. He was prescribed Tecfidera but he is never started since he was worried about side effects profile. Cell antibodies, hep B antibodies were checked, he did not have any active infections. TB was checked and he was negative. He was positive for ARLETTE virus antibodies. He still did not start Annmarie Saint despite receiving prescription already. He was still quite worried about opportunistic infections. Patient had another telemedicine visit in December 2023 still he has not started with any medications.   Apparently patient shipped Annmarie Saint back to go pharmacy    Past Medical History:   Diagnosis Date    Asthma     Esophageal reflux     MRSA (methicillin resistant Staphylococcus aureus)        Past Surgical History:   Procedure Laterality Date    FRACTURE SURGERY         Social history:  History   Smoking Status    Former   Smokeless Tobacco    Never       History   Alcohol Use    Yes     Comment: 1x month       History   Drug Use Unknown         Family History   Problem Relation Age of Onset    Asthma Maternal Grandmother          Current Outpatient Medications:     diphenhydrAMINE 25 MG Oral Cap, Please take 1 to 2 tablets every 6-8 hours as needed for itching/hives for the next 3 days, Disp: 30 capsule, Rfl: 0    Ofatumumab (KESIMPTA) 20 MG/0.4ML Subcutaneous Solution Auto-injector, Inject 1 Pen into the skin every 30 (thirty) days. , Disp: 4 each, Rfl: 0    EPINEPHrine (EPIPEN 2-LAURA) 0.3 MG/0.3ML Injection Solution Auto-injector, Inject IM in event of  allergic reaction (Patient taking differently: Inject IM in event of  allergic reaction), Disp: 1 each, Rfl: 0    Allergies   Allergen Reactions    Acetaminophen HIVES    Doxycycline HIVES    Omeprazole PALPITATIONS, FATIGUE and SHORTNESS OF BREATH    Protonix [Pantoprazole] HIVES and FACE FLUSHING     IV injection reaction    Chocolate OTHER (SEE COMMENTS)    Banana ITCHING    Shrimp ITCHING    Shrimp Flavor ITCHING       ROS:   As in HPI, the rest of the 14 system review was done and was negative      Physical Exam:  There were no vitals filed for this visit. General: No apparent distress, well nourished, well groomed. Head- Normocephalic, atraumatic  Eyes- No redness or swelling    Neurological:     Mental Status- Alert and oriented x3. Normal attention span and concentration  Thought process intact  Memory intact- recent and remote  Mood intact  Fund of knowledge appropriate for education and age    Language intact including: comprehension, naming, repetition, vocabulary      Labs:    No results found for: \"TSH\"  No results found for: \"CHOL\", \"HDL\", \"LDL\", \"TRIG\"  Lab Results   Component Value Date    HGB 14.4 05/20/2022    HCT 43.9 05/20/2022    MCV 93.0 05/20/2022    WBC 5.0 05/20/2022    .0 05/20/2022      Lab Results   Component Value Date    BUN 12 05/20/2022    CA 8.7 05/20/2022     05/20/2022    K 4.0 05/20/2022     05/20/2022    CO2 26.0 05/20/2022      I have reviewed labs.     Imaging Studies:  I have independently reviewed imaging. MRI of the brain, cervical and thoracic spine was independently reviewed as above. Assessment   1. MS (multiple sclerosis) (HCC)  Possible multiple sclerosis, even though burden of the disease is not relatively high, it is reasonable to start disease modifying therapy, we discussed different options, patient will think about Nicholaus Kale or Tecfidera or Copaxone. Emphasized importance of disease modifying therapy to prevent further progression of the disease. Also advised to get any vaccinations done before starting any treatments especially with Grazyna Morales or Ana Maria Vyas. He will think about options and let us know. Patient agreed to start care center. He will get the first injection with the nurse in our offices. 2. Ulnar neuropathy at elbow of left upper extremity  Possible ulnar neuropathy at the left elbow, EMG will be done after occupational therapy will be tried first.       Education and counseling provided to patient. Instructed patient to call my office or seek medical attention immediately if symptoms worsen. Patient verbalized understanding of information given. All questions were answered. All side effects of drugs were discussed. Return to clinic in: No follow-ups on file.     Nava Chaudhary MD

## 2023-12-12 NOTE — TELEPHONE ENCOUNTER
The patient has decided to start Sidumula 30. [facility] and they will reopen his case and resubmit the information. Per the patients insurance, he is approved for Sidumula 30 until 2025. I have contacted [facility] at [phone] to initiate loading the medication and they will reach out to Sidula 30. Reviewed and electronically signed by:  [address], [facility]

## 2023-12-23 ENCOUNTER — PATIENT MESSAGE (OUTPATIENT)
Dept: NEUROLOGY | Facility: CLINIC | Age: 33
End: 2023-12-23

## 2023-12-29 NOTE — TELEPHONE ENCOUNTER
From: Justin FULLER May  To: Ridge Gold  Sent: 12/23/2023 4:16 PM CST  Subject: Facial spasms    Hey, I started recently having facial spasms on the right side of my face above my eye lid, in my cheek and partially my lip. And my right side of my lip is sort of curved upwards as well. And I figured this happened not too long after I had some bug bites on my arm and side that led to hives but once that subsided, my face started feeling weird and the spasms started so I was wondering who to see for a check up. I have a doctor appointment booked with my primary doctor but I feel like I need to see a neurologist dealing with the nerve.

## 2024-01-02 ENCOUNTER — OFFICE VISIT (OUTPATIENT)
Dept: NEUROLOGY | Facility: CLINIC | Age: 34
End: 2024-01-02
Payer: COMMERCIAL

## 2024-01-02 ENCOUNTER — PATIENT MESSAGE (OUTPATIENT)
Dept: NEUROLOGY | Facility: CLINIC | Age: 34
End: 2024-01-02

## 2024-01-02 DIAGNOSIS — G35 MS (MULTIPLE SCLEROSIS) (HCC): ICD-10-CM

## 2024-01-02 DIAGNOSIS — G35 MS (MULTIPLE SCLEROSIS) (HCC): Primary | ICD-10-CM

## 2024-01-02 PROCEDURE — 99214 OFFICE O/P EST MOD 30 MIN: CPT | Performed by: OTHER

## 2024-01-02 RX ORDER — METHYLPREDNISOLONE 4 MG/1
TABLET ORAL
Qty: 1 EACH | Refills: 0 | Status: SHIPPED | OUTPATIENT
Start: 2024-01-02

## 2024-01-02 NOTE — TELEPHONE ENCOUNTER
Requested Prescriptions     Pending Prescriptions Disp Refills    Ofatumumab (KESIMPTA) 20 MG/0.4ML Subcutaneous Solution Auto-injector 4 each 0     Sig: Inject 1 Pen into the skin every 30 (thirty) days.       Last OV: 1/2/24  Next OV: None  Last refilled: 8/2/23

## 2024-01-02 NOTE — PROGRESS NOTES
Neurology Follow up Visit     Referred By: Dr. Moore ref. provider found    Chief Complaint: No chief complaint on file.      HPI:     Justin Leblanc is a 33 year old male, who presents for history of possible multiple sclerosis, diagnosed since 2022.  However symptoms might have started in 2021 or 2020, when he had weakness of both lower extremities for only few seconds.  Than the other time he had weakness of the right lower extremity, about the time of the left lower extremity Dr Lee for few weeks and that time he presented to the neurology and was tested with MRIs of the cervical, thoracic and brain and it did show enhancing lesion in the left hemisphere, additionally nonenhancing lesion in the spinal cord though it might have been one of them enhancing, though difficult to assess with certainty since it was a poor quality imaging.  Additional nonenhancing lesions in the brainstem left and right hemispheres, some of the juxtacortical and some were periventricular.  Overall burden of disease was relatively mild at that time.  CSF was obtained, and while there were 2 oligo bands in the CSF unclear how many were in the serum that since it was not tested at that time.  Patient continued with symptoms of numbness in the left arm that he felt was result of the injury to the left elbow that resulting in numbness in the last 2 fingers of the left hand.  He was prescribed Tecfidera but he is never started since he was worried about side effects profile.    ARLETTE virus antibodies, hep B antibodies were checked, he did not have any active infections.  TB was checked and he was negative.  He was positive for ARLETTE virus antibodies.  He still did not start Kesimpta despite receiving prescription already.    He was still quite worried about opportunistic infections.    Patient had another telemedicine visit in December 2023 still he has not started with any medications.  Apparently patient shipped Kesimpta back to go pharmacy.   This time he did agree to start with his symptoms.    Patient came back for follow-up in January 2023.  Patient reported  Developed symptoms of right facial paresthesias some weakness of the left side of the face, flattening of nasolabial fold on the left side but twitching on the right side.  He fell it was associated with a recent bug bite     Past Medical History:   Diagnosis Date    Asthma     Esophageal reflux     MRSA (methicillin resistant Staphylococcus aureus)        Past Surgical History:   Procedure Laterality Date    FRACTURE SURGERY         Social history:  History   Smoking Status    Former   Smokeless Tobacco    Never       History   Alcohol Use    Yes     Comment: 1x month       History   Drug Use Unknown         Family History   Problem Relation Age of Onset    Asthma Maternal Grandmother          Current Outpatient Medications:     diphenhydrAMINE 25 MG Oral Cap, Please take 1 to 2 tablets every 6-8 hours as needed for itching/hives for the next 3 days, Disp: 30 capsule, Rfl: 0    Ofatumumab (KESIMPTA) 20 MG/0.4ML Subcutaneous Solution Auto-injector, Inject 1 Pen into the skin every 30 (thirty) days., Disp: 4 each, Rfl: 0    EPINEPHrine (EPIPEN 2-LAURA) 0.3 MG/0.3ML Injection Solution Auto-injector, Inject IM in event of  allergic reaction (Patient taking differently: Inject IM in event of  allergic reaction), Disp: 1 each, Rfl: 0    Allergies   Allergen Reactions    Acetaminophen HIVES    Doxycycline HIVES    Omeprazole PALPITATIONS, FATIGUE and SHORTNESS OF BREATH    Protonix [Pantoprazole] HIVES and FACE FLUSHING     IV injection reaction    Chocolate OTHER (SEE COMMENTS)    Banana ITCHING    Shrimp ITCHING    Shrimp Flavor ITCHING       ROS:   As in HPI, the rest of the 14 system review was done and was negative      Physical Exam:  There were no vitals filed for this visit.      General: No apparent distress, well nourished, well groomed.  Head- Normocephalic, atraumatic  Eyes- No redness or  swelling    Neurological:     Mental Status- Alert and oriented x3.  Normal attention span and concentration  Thought process intact  Memory intact- recent and remote  Mood intact  Fund of knowledge appropriate for education and age    Language intact including: comprehension, naming, repetition, vocabulary      Flattening of the nasolabial fold on the left side, but some occasional myokymia noted of the right eyelid    Labs:    No results found for: \"TSH\"  No results found for: \"CHOL\", \"HDL\", \"LDL\", \"TRIG\"  Lab Results   Component Value Date    HGB 14.4 05/20/2022    HCT 43.9 05/20/2022    MCV 93.0 05/20/2022    WBC 5.0 05/20/2022    .0 05/20/2022      Lab Results   Component Value Date    BUN 12 05/20/2022    CA 8.7 05/20/2022     05/20/2022    K 4.0 05/20/2022     05/20/2022    CO2 26.0 05/20/2022      I have reviewed labs.    Imaging Studies:  I have independently reviewed imaging.  MRI of the brain, cervical and thoracic spine was independently reviewed as above.      Assessment   1. MS (multiple sclerosis) (HCC)  Possible multiple sclerosis, with possibly new attack causing some focal symptoms in the face.    Emphasized importance of disease modifying therapy to prevent further progression of the disease.  Also advised to get any vaccinations done before starting any treatments especially with Kesimpta or Ocrevus.    Patient agreed to start Kesimpta.  He will get the first injection with the nurse in our offices.    MRI of the brain with and without contrast also be ordered in few days  Recent exacerbation he will be prescribed Medrol pack.    2. Ulnar neuropathy at elbow of left upper extremity  Possible ulnar neuropathy at the left elbow, EMG will be done after occupational therapy will be tried first.       Education and counseling provided to patient. Instructed patient to call my office or seek medical attention immediately if symptoms worsen.  Patient verbalized understanding of  information given. All questions were answered. All side effects of drugs were discussed.       Return to clinic in: No follow-ups on file.    Mehran Bosch MD

## 2024-01-02 NOTE — TELEPHONE ENCOUNTER
From: Justin Leblanc  To: Mehran Bosch  Sent: 1/2/2024 11:16 AM CST  Subject: Facial Spasms    Quick question will the steroids, potentially help the face go back to normal on the right side and calm it down?

## 2024-01-05 RX ORDER — OFATUMUMAB 20 MG/.4ML
1 INJECTION, SOLUTION SUBCUTANEOUS
Qty: 4 EACH | Refills: 0 | Status: SHIPPED | OUTPATIENT
Start: 2024-01-05

## 2024-01-08 ENCOUNTER — PATIENT MESSAGE (OUTPATIENT)
Dept: NEUROLOGY | Facility: CLINIC | Age: 34
End: 2024-01-08

## 2024-01-08 NOTE — TELEPHONE ENCOUNTER
From: Justin FULLER May  To: Mehran Bosch  Sent: 1/8/2024 1:56 PM CST  Subject: Appointment     I was replying back to the last message, can we set up an appointment actually with Dr. Bosch, it can even be a Video call, but I would like to start treatment with Avonex instead of Kesimpta. I’ve done some heavy research and by me being ARLETTE virus positive I want to take a medication that has no interaction with said virus. And Avonex seems to be a good middle ground for me. So I would like to discuss it with him first also, options of taking Natural supplements with it to treat my MS. I spoken too a doctor who deals with Natural remedies and they said to speak with my doctor to make sure I could use them some what at the same time to help.

## 2024-01-08 NOTE — TELEPHONE ENCOUNTER
Called & spoke to pt. Pt would like to discuss starting Avenox instead of Kesimpta. Pt scheduled for video visit as requested.     Pt asked about sending Kesimpta back to the pharmacy or our office. Advised pt we do not handle prescriptions & he should check with the pharmacy he received it from. He is aware they may not take the medication back. He plans to reach out to the pharmacy to discuss.

## 2024-01-16 ENCOUNTER — TELEMEDICINE (OUTPATIENT)
Dept: NEUROLOGY | Facility: CLINIC | Age: 34
End: 2024-01-16
Payer: COMMERCIAL

## 2024-01-16 DIAGNOSIS — G35 MS (MULTIPLE SCLEROSIS) (HCC): Primary | ICD-10-CM

## 2024-01-16 PROCEDURE — 99214 OFFICE O/P EST MOD 30 MIN: CPT | Performed by: OTHER

## 2024-01-16 RX ORDER — INTERFERON BETA-1A 30MCG/.5ML
30 KIT INTRAMUSCULAR WEEKLY
Qty: 4 EACH | Refills: 11 | Status: SHIPPED | OUTPATIENT
Start: 2024-01-16

## 2024-01-16 NOTE — PROGRESS NOTES
I conducted a telehealth visit with Justin Leblanc today, 01/16/24, which was completed using two-way, real-time interactive audio and video communication. This has been done in good maritza to provide continuity of care in the best interest of the provider-patient relationship, due to the COVID -19 public health crisis/national emergency where restrictions of face-to-face office visits are ongoing. Every conscious effort was taken to allow for sufficient and adequate time to complete the visit.  The patient was made aware of the limitations of the telehealth visit, including treatment limitations as no physical exam could be performed.  The patient was advised to call 911 or to go to the ER in case there was an emergency.  The patient was also advised of the potential privacy & security concerns related to the telehealth platform.   The patient was made aware of where to find [facility]'s notice of privacy practices, telehealth consent form and other related consent forms and documents.  which are located on the [facility] website. The patient verbally agreed to telehealth consent form, related consents and the risks discussed.    Lastly, the patient confirmed that they were in [place].   Included in this visit, time may have been spent reviewing labs, medications, radiology tests and decision making. Appropriate medical decision-making and tests are ordered as detailed in the plan of care above.  Coding/billing information is submitted for this visit based on complexity of care and/or time spent for the visit.        Neurology Follow up Visit     Referred By: Dr. Moore ref. provider found    Chief Complaint: No chief complaint on file.      HPI:     Justin Leblanc is a 33 year old male, who presents for history of possible multiple sclerosis, diagnosed since 2022.  However symptoms might have started in 2021 or 2020, when he had weakness of both lower extremities for only few seconds.  Than the other time he had weakness of the right  lower extremity, about the time of the left lower extremity Dr Lee for few weeks and that time he presented to the neurology and was tested with MRIs of the cervical, thoracic and brain and it did show enhancing lesion in the left hemisphere, additionally nonenhancing lesion in the spinal cord though it might have been one of them enhancing, though difficult to assess with certainty since it was a poor quality imaging.  Additional nonenhancing lesions in the brainstem left and right hemispheres, some of the juxtacortical and some were periventricular.  Overall burden of disease was relatively mild at that time.  CSF was obtained, and while there were 2 oligo bands in the CSF unclear how many were in the serum that since it was not tested at that time.  Patient continued with symptoms of numbness in the left arm that he felt was result of the injury to the left elbow that resulting in numbness in the last 2 fingers of the left hand.  He was prescribed Tecfidera but he is never started since he was worried about side effects profile.    ARLETTE virus antibodies, hep B antibodies were checked, he did not have any active infections.  TB was checked and he was negative.  He was positive for ARLETTE virus antibodies.  He still did not start Kesimpta despite receiving prescription already.    He was still quite worried about opportunistic infections.    Patient had another telemedicine visit in December 2023 still he has not started with any medications.  Apparently patient shipped Kesimpta back to go pharmacy.  This time he did agree to start with his symptoms.    Patient came back for follow-up in January 2023.  Patient reported  Developed symptoms of right facial paresthesias some weakness of the left side of the face, flattening of nasolabial fold on the left side but twitching on the right side.  He felt it was associated with a recent bug bite.    Video appointment, patient was still worried about medications,m and decided  to start with Avonex, rather than Kesimpta    Past Medical History:   Diagnosis Date    Asthma     Esophageal reflux     MRSA (methicillin resistant Staphylococcus aureus)        Past Surgical History:   Procedure Laterality Date    FRACTURE SURGERY         Social history:  History   Smoking Status    Former   Smokeless Tobacco    Never       History   Alcohol Use    Yes     Comment: 1x month       History   Drug Use Unknown         Family History   Problem Relation Age of Onset    Asthma Maternal Grandmother          Current Outpatient Medications:     Ofatumumab (KESIMPTA) 20 MG/0.4ML Subcutaneous Solution Auto-injector, Inject 1 Pen into the skin every 30 (thirty) days., Disp: 4 each, Rfl: 0    methylPREDNISolone (MEDROL) 4 MG Oral Tablet Therapy Pack, Take as directed, Disp: 1 each, Rfl: 0    diphenhydrAMINE 25 MG Oral Cap, Please take 1 to 2 tablets every 6-8 hours as needed for itching/hives for the next 3 days, Disp: 30 capsule, Rfl: 0    EPINEPHrine (EPIPEN 2-LAURA) 0.3 MG/0.3ML Injection Solution Auto-injector, Inject IM in event of  allergic reaction (Patient taking differently: Inject IM in event of  allergic reaction), Disp: 1 each, Rfl: 0    Allergies   Allergen Reactions    Acetaminophen HIVES    Doxycycline HIVES    Omeprazole PALPITATIONS, FATIGUE and SHORTNESS OF BREATH    Protonix [Pantoprazole] HIVES and FACE FLUSHING     IV injection reaction    Chocolate OTHER (SEE COMMENTS)    Banana ITCHING    Shrimp ITCHING    Shrimp Flavor ITCHING       ROS:   As in HPI, the rest of the 14 system review was done and was negative      Physical Exam:  There were no vitals filed for this visit.      General: No apparent distress, well nourished, well groomed.  Head- Normocephalic, atraumatic  Eyes- No redness or swelling    Neurological:     Mental Status- Alert and oriented x3.  Normal attention span and concentration  Thought process intact  Memory intact- recent and remote  Mood intact  Fund of knowledge  appropriate for education and age    Language intact including: comprehension, naming, repetition, vocabulary      Flattening of the nasolabial fold on the left side, but some occasional myokymia noted of the right eyelid    Labs:    No results found for: \"TSH\"  No results found for: \"CHOL\", \"HDL\", \"LDL\", \"TRIG\"  Lab Results   Component Value Date    HGB 14.4 05/20/2022    HCT 43.9 05/20/2022    MCV 93.0 05/20/2022    WBC 5.0 05/20/2022    .0 05/20/2022      Lab Results   Component Value Date    BUN 12 05/20/2022    CA 8.7 05/20/2022     05/20/2022    K 4.0 05/20/2022     05/20/2022    CO2 26.0 05/20/2022      I have reviewed labs.    Imaging Studies:  I have independently reviewed imaging.  MRI of the brain, cervical and thoracic spine was independently reviewed as above.      Assessment   1. MS (multiple sclerosis) (HCC)  Multiple sclerosis, with possibly new attack causing some focal symptoms in the face. We discussed acupuncture    Emphasized importance of disease modifying therapy to prevent further progression of the disease.  Also advised to get any vaccinations done before starting any treatments especially with Kesimpta or Ocrevus. In the end patient decided to proceed with Avonex, due to safety concerns even it wsa explained that less efficacy expected.    Patient agreed to start Kesimpta.  He will get the first injection with the nurse in our offices.    MRI of the brain with and without contrast also be ordered in few days  Recent exacerbation he will be prescribed Medrol pack.    2. Ulnar neuropathy at elbow of left upper extremity  Possible ulnar neuropathy at the left elbow, EMG will be done after occupational therapy will be tried first.       Education and counseling provided to patient. Instructed patient to call my office or seek medical attention immediately if symptoms worsen.  Patient verbalized understanding of information given. All questions were answered. All side effects  of drugs were discussed.       Return to clinic in: No follow-ups on file.    Mehran Bosch MD

## 2024-01-17 ENCOUNTER — TELEPHONE (OUTPATIENT)
Dept: NEUROLOGY | Facility: CLINIC | Age: 34
End: 2024-01-17

## 2024-01-17 NOTE — TELEPHONE ENCOUNTER
Received approval for Avonex via fax.  Approval granted 12/17/23 - 1/15/26.  Form has been sent for scanning.  Reviewed and electronically signed by: ARON Lindsay

## 2025-06-26 ENCOUNTER — TELEPHONE (OUTPATIENT)
Dept: NEUROLOGY | Facility: CLINIC | Age: 35
End: 2025-06-26

## 2025-06-30 NOTE — TELEPHONE ENCOUNTER
Called patient, Left message to call back, please obtain details. Note patient needs appointment, patient last appointment 01/16/24.

## 2025-06-30 NOTE — TELEPHONE ENCOUNTER
Family Medical Leave Act forms received in forms dept. Logged for processing. [employer] message sent with Release of Information questionnaire.

## 2025-07-02 NOTE — TELEPHONE ENCOUNTER
Patient call back, informed patient it's been over a year since he was seen by provider. Patient stated he has been in the process of looking for a new provider. Informed him an appointment is needed for the forms to be complete. Patient stated he will rather establish care with a different provider to have his forms completed. Forms placed in archives.

## 2025-07-02 NOTE — TELEPHONE ENCOUNTER
Called patient, 2nd attempt to reach patient. Left message to call back. Forms placed in hold folder.

## 2025-07-03 ENCOUNTER — TELEPHONE (OUTPATIENT)
Dept: NEUROLOGY | Facility: CLINIC | Age: 35
End: 2025-07-03

## 2025-07-09 ENCOUNTER — TELEPHONE (OUTPATIENT)
Dept: NEUROLOGY | Facility: CLINIC | Age: 35
End: 2025-07-09

## 2025-07-09 NOTE — TELEPHONE ENCOUNTER
Spoke with pt about his scheduled apt for today with Dr Dale. Pt was already established with Dr Bosch (LOV 1/2/2024). Pt stated he was looking to transfer his care to dr dale. Please advise if this is ok so we can reschedule him for next available with provider

## 2025-07-11 ENCOUNTER — MED REC SCAN ONLY (OUTPATIENT)
Dept: NEUROLOGY | Facility: CLINIC | Age: 35
End: 2025-07-11

## 2025-07-14 ENCOUNTER — MOBILE ENCOUNTER (OUTPATIENT)
Dept: NEUROLOGY | Facility: CLINIC | Age: 35
End: 2025-07-14

## 2025-07-14 DIAGNOSIS — G35 MS (MULTIPLE SCLEROSIS) (HCC): Primary | ICD-10-CM

## 2025-07-15 ENCOUNTER — HOSPITAL ENCOUNTER (OUTPATIENT)
Dept: MRI IMAGING | Facility: HOSPITAL | Age: 35
Discharge: HOME | End: 2025-07-15
Attending: Other
Payer: COMMERCIAL

## 2025-07-15 ENCOUNTER — TELEPHONE (OUTPATIENT)
Dept: NEUROLOGY | Facility: CLINIC | Age: 35
End: 2025-07-15

## 2025-07-15 DIAGNOSIS — G35 MS (MULTIPLE SCLEROSIS) (HCC): ICD-10-CM

## 2025-07-15 PROCEDURE — 70553 MRI BRAIN STEM W/O & W/DYE: CPT | Performed by: OTHER

## 2025-07-15 PROCEDURE — A9575 INJ GADOTERATE MEGLUMI 0.1ML: HCPCS | Performed by: OTHER

## 2025-07-15 RX ORDER — GADOTERATE MEGLUMINE 376.9 MG/ML
15 INJECTION INTRAVENOUS
Status: COMPLETED | OUTPATIENT
Start: 2025-07-15 | End: 2025-07-15

## 2025-07-15 RX ADMIN — GADOTERATE MEGLUMINE 15 ML: 376.9 INJECTION INTRAVENOUS at 14:50:00

## 2025-07-16 ENCOUNTER — TELEPHONE (OUTPATIENT)
Dept: NEUROLOGY | Facility: CLINIC | Age: 35
End: 2025-07-16

## 2025-07-16 NOTE — TELEPHONE ENCOUNTER
Received a message from Kenyon Strickland DO to Eunice Velarde Nurse (Selected Message)  Hi, Please call the patient let him know his mri did not show any active disease. He needs to follow up with Dr. HARGROVE next week. Based on the MRI there's no need for steroids.  kind regards.

## 2025-07-18 NOTE — TELEPHONE ENCOUNTER
Called pt, no answer, left a voicemail asking to please call neurology office to schedule an appt with Dr. Bosch. Provided neurology office number.

## 2025-07-24 NOTE — TELEPHONE ENCOUNTER
Called pt to offer an appt with provider, no answer, left a vm asking to call neurology clinic back.   Pt read [employer] message advising to call and make an appt with Dr. Bosch.

## (undated) DIAGNOSIS — R13.19 ESOPHAGEAL DYSPHAGIA: Primary | ICD-10-CM

## (undated) DIAGNOSIS — R10.10 UPPER ABDOMINAL PAIN: ICD-10-CM

## (undated) DIAGNOSIS — R10.10 UPPER ABDOMINAL PAIN, UNSPECIFIED: ICD-10-CM

## (undated) NOTE — ED AVS SNAPSHOT
Justin Leblanc   MRN: [id number]    Department:  [facility]   Date of Visit:  9/12/2019           Disclosure     Insurance plans vary and the physician(s) referred by the ER may not be covered by your plan.  Please contact your in CARE PHYSICIAN AT ONCE OR RETURN IMMEDIATELY TO THE EMERGENCY DEPARTMENT. If you have been prescribed any medication(s), please fill your prescription right away and begin taking the medication(s) as directed.   If you believe that any of the medications

## (undated) NOTE — LETTER
23          Justin FULLER May  :  10/24/1990      To Whom It May Concern: This patient was seen in our office on 23 . Please note due to the patients current medical conditions, he will need to avoid any and all stress triggers to not induce a flare of his health. Please allow the patient to change units if needed due to any noisy environment that can cause stress and a flare in his medical condition. If this office may be of further assistance, please do not hesitate to contact us.       Sincerely,        Jesse Alexandra MD  Neurology

## (undated) NOTE — LETTER
[address] [address]  [address] [address]  [phone]        Dear Gaby Rodriguez,      I had the pleasure of seeing your patient, Justin Leblanc on 7/12/2022. Below please find a summary of our visit. If you have any concerns or questions, please feel free to give me a call to discuss. Sincerely,  Stephanie Cavanaugh MD, MD     Mr. Leblanc, relates over Father's Day he had couple episodes of lower extremity weakness lasting couple minutes. Also associated with a burning sensation in the right posterior leg to buttocks. Left carpal tunnel symptoms. Left lateral epicondylitis. 1 episode of a fall with lower lumbar spine pain which resolved. At present time he denies headache cervical thoracic lumbar spine pain. No bowel bladder dysfunction. He denies any weakness in the legs at the present time. No visual symptoms cranial nerve symptoms. He works at the [employer]    Review of system -12 years    Exam pupils react to light accommodation. Optic disc flat. Visual fields are full. Cranial nerves normal.  No SAUNDRA. No Williemae Tam phenomena. Strength in arms and legs normal.  Slight decreased alternating motion rates in left hand. APB strength normal.  Deep tendon reflexes are symmetric in the upper extremities. Left patellar reflex is significantly brisker than the right. No Babinski signs. Joint position sense is normal.  Tone is normal.  Finger-nose normal.  Gait is normal.    Impression: Although the findings are subtle, very brisk reflex in the left leg, decreased alternating motion rates in left hand, history, we will proceed with MRI of the brain cervical thoracic spine. Possibility of multiple sclerosis. With next blood test would recommend primary care doctor obtain vitamin B12 level. I will call him with results of the MRI scan. Asked him to call the office 2-3 days after completing the MRI scans.

## (undated) NOTE — LETTER
[address] [address]  [phone]     Patient: Justin May   YOB: 1990   Date of Visit: 12/3/2021     Dear Employer,        December 3, 2021    At [facility], we are aubrey symptoms may discontinue isolation and other precautions 10 days after the date of their first positive RT-PCR test for SARS-CoV-2 RNA.     Persons who are asymptomatic but have been exposed, CDC recommends 14 days of quarantine after exposure based on the

## (undated) NOTE — LETTER
[facility]  [phone] [id number]  Dept: [phone]      November 3, 2022    Patient: Natalya Leblanc   Date of Visit: 11/3/2022       To Whom It May Concern:    Justin Leblanc was seen and treated in our department on 11/3/2022. He will be incapacitated on Friday 11/4/22 and unable to work. He may return on 11/5/22. If you have any questions or concerns, please don't hesitate to call. Encounter Provider(s):     Jennifer Vo MD     2:07 PM  11/3/2022

## (undated) NOTE — LETTER
Date & Time: 12/16/2021, 6:40 PM  Patient: Justin Leblanc  Encounter Provider(s):    Vernon George MD       To Whom It May Concern:    Justin Leblanc was seen and treated in our department on 12/16/2021. He should not return to work until 12/17/2021.     If you ha

## (undated) NOTE — LETTER
Date & Time: 2/5/2021, 1:15 PM  Patient: Justin Leblanc  Encounter Provider(s):    William Duran MD       To Whom It May Concern:    Justin Leblanc was seen and treated in our department on 2/5/2021. He is unable to work February 7, 2021.     If you have any que

## (undated) NOTE — LETTER
[address] [facility]  [phone]  [phone]  Authorization for Imaging Procedure  Date of Procedure: 11/3/2022    1. I hereby authorize Dr. Jan Guallpa, my physician and his/her assistants (if applicable), which may include medical students, residents, and/or fellows, to perform the following procedure and administer such anesthesia as may be determined necessary by my physician: XR LUMBAR PUNCTURE DIAG, INCLD IMG ([id number]) on Gene J May.   2.  I recognize that during the procedure, unforeseen conditions may necessitate additional or different procedures than those listed above. I, therefore, further authorize and request that the above-named physician, assistants, or designees perform such procedures as are, in their judgment, necessary and desirable. 3.  My physician has discussed prior to my procedure the potential benefits, risks and side effects of this procedure; the likelihood of achieving goals; and potential problems that might occur during recuperation. They also discussed reasonable alternatives to the procedure, including risks, benefits, and side effects related to the alternatives and risks related to not receiving this procedure. I have had all my questions answered and I acknowledge that no guarantee has been made as to the result that may be obtained. 4.  Should the need arise during my procedure, which includes change of level of care prior to discharge, I also consent to the administration of blood and/or blood products. Further, I understand that despite careful testing and screening of blood or blood products by collecting agencies, I may still be subject to ill effects as a result of receiving a blood transfusion and/or blood products.  The following are some, but not all, of the potential risks that can occur: fever and allergic reactions, hemolytic reactions, transmission of diseases such as Hepatitis, AIDS and Cytomegalovirus (CMV) and fluid overload. In the event that I wish to have an autologous transfusion of my own blood, or a directed donor transfusion, I will discuss this with my physician. Check only if Refusing Blood or Blood Products  I understand refusal of blood or blood products as deemed necessary by my physician may have serious consequences to my condition to include possible death. I hereby assume responsibility for my refusal and release the hospital, its personnel, and my physicians from any responsibility for the consequences of my refusal.   [  ] Patient Refuses Blood      5. I authorize the use of any specimen, organs, tissues, body parts or foreign objects that may be removed from my body during the procedure for diagnosis, research or teaching purposes and their subsequent disposal by hospital authorities. I also authorize the release of specimen test results and/or written reports to my treating physician on the hospital medical staff or other referring or consulting physicians involved in my care, at the discretion of the Pathologist or my treating physician. 6.  I consent to the photographing or videotaping of the procedures to be performed, including appropriate portions of my body for medical, scientific, or educational purposes, provided my identity is not revealed by the pictures or by descriptive texts accompanying them. If the procedure has been photographed/videotaped, the physician will obtain the original picture, image, videotape or CD. The hospital will not be responsible for storage, release or maintenance of the picture, image, tape or CD.   7.  I consent to the presence of a  or observers in the operating room as deemed necessary by my physician or their designees. 8.  I recognize that in the event my procedure results in extended X-Ray/fluoroscopy time, I may develop a skin reaction. 9.   If I have a Do Not Attempt Resuscitation (DNAR) order in place, that status will be suspended while in the operating room, procedural suite, and during the recovery period unless otherwise explicitly stated by me (or a person authorized to consent on my behalf). The performing physician or my attending physician will determine when the applicable recovery period ends for purposes of reinstating the DNAR order. 10.  I acknowledge that my physician has explained sedation/analgesia administration to me including the risk and benefits I consent to the administration of sedation/analgesia as may be necessary or desirable in the judgment of my physician. I CERTIFY THAT I HAVE READ AND FULLY UNDERSTAND THE ABOVE CONSENT FOR THE PROCEDURE.    Signature of Patient: _____________________________________________________________  Responsible person in case of minor, unconscious: ____________________________________  Relationship to patient:  __________________________________________________________    Signature of Witness: _______________________________Date: _________Time: __________    Patient Name: Justin FULLER May : 10/24/1990  Printed: November 3, 2022   Medical Record #: [id number]

## (undated) NOTE — LETTER
Date & Time: 6/24/2022, 4:10 AM  Patient: Justin Leblanc  Encounter Provider(s):    Kip Breen MD       To Whom It May Concern:    Justin Leblanc was seen and treated in our department on 6/24/2022. He can return to work with these limitations: Light duty, no lifting over 5 pounds. Restrictions apply until 7/14/2022.     If you have any questions or concerns, please do not hesitate to call.        _____________________________  Physician/APC Signature

## (undated) NOTE — LETTER
[address] [place] [address]  [phone]     Patient: Justin May   YOB: 1990   Date of Visit: 12/3/2021     Dear Employer,        December 3, 2021    At [facility], we are aubrey symptoms may discontinue isolation and other precautions 10 days after the date of their first positive RT-PCR test for SARS-CoV-2 RNA.     Persons who are asymptomatic but have been exposed, CDC recommends 14 days of quarantine after exposure based on the